# Patient Record
Sex: FEMALE | Race: WHITE | NOT HISPANIC OR LATINO | Employment: OTHER | URBAN - METROPOLITAN AREA
[De-identification: names, ages, dates, MRNs, and addresses within clinical notes are randomized per-mention and may not be internally consistent; named-entity substitution may affect disease eponyms.]

---

## 2017-07-22 ENCOUNTER — HOSPITAL ENCOUNTER (EMERGENCY)
Facility: CLINIC | Age: 32
Discharge: HOME | End: 2017-07-22
Attending: PHYSICIAN ASSISTANT | Admitting: PHYSICIAN ASSISTANT
Payer: MEDICARE

## 2017-07-22 VITALS
TEMPERATURE: 98.1 F | HEART RATE: 72 BPM | RESPIRATION RATE: 16 BRPM | OXYGEN SATURATION: 95 % | WEIGHT: 198 LBS | DIASTOLIC BLOOD PRESSURE: 108 MMHG | SYSTOLIC BLOOD PRESSURE: 142 MMHG

## 2017-07-22 DIAGNOSIS — K08.89 PAIN, DENTAL: Primary | ICD-10-CM

## 2017-07-22 PROCEDURE — 99213 OFFICE O/P EST LOW 20 MIN: CPT | Performed by: PHYSICIAN ASSISTANT

## 2017-07-22 PROCEDURE — 99212 OFFICE O/P EST SF 10 MIN: CPT

## 2017-07-22 RX ORDER — PENICILLIN V POTASSIUM 500 MG/1
500 TABLET, FILM COATED ORAL 4 TIMES DAILY
Qty: 40 TABLET | Refills: 0 | Status: SHIPPED | OUTPATIENT
Start: 2017-07-22 | End: 2017-08-01

## 2017-07-22 ASSESSMENT — ENCOUNTER SYMPTOMS
FACIAL SWELLING: 0
CARDIOVASCULAR NEGATIVE: 1
RESPIRATORY NEGATIVE: 1
MUSCULOSKELETAL NEGATIVE: 1
CONSTITUTIONAL NEGATIVE: 1

## 2017-07-22 NOTE — ED AVS SNAPSHOT
[facility] Emergency Department    [address] [address]    Phone:  [phone]    Fax:  [phone]                                       Haroon Cruz   MRN: [id number]    Department:  [facility] Emergency Department   Date of Visit:  7/22/2017           Patient Information     Date Of Birth          1985        Your diagnoses for this visit were:     Pain, dental        You were seen by Blanca Sanchez PA-C.      Follow-up Information     Schedule an appointment as soon as possible for a visit with Dentist.        Follow up with [facility] Emergency Department.    Specialty:  EMERGENCY MEDICINE    Why:  As needed, If symptoms worsen    Contact information:    [address] [address] [phone]    Additional information:    The medical center is located at   [address] ([address]   [facility] [address] in [place], [place] north   of [place]).        Discharge Instructions       Many of these clinics offer a sliding fee option for patients that qualify, and see patients on a walk-in or same day basis. Please call each clinic directly. As services, hours, fees and policies vary greatly.          [facility], [facility]  [phone]  Sees no insurance  [facility], [place]  [phone]  Preventive services only  [facility] (mult loc) [phone]  [facility]    ([facility]), [facility]  [phone]  [facility]       [phone]  Preventive services only  [facility]  [phone]  Accepts MA & sees no ins  Critical access hospital Dental Care,      Accepts MA & sees no ins   [place]   [phone]; [phone]  [facility]   Accepts MA & sees no ins       [phone]  [facility], [facility]  [phone]   Accepts MA emergencies  [facility] [phone]  [facility],     Accepts  [facility]   [phone]    [facility], [place] [phone]  Accepts MA & sees no ins   [facility]   Dental Clinic    [phone]  [facility], [facility]  [phone]   [facility] [phone]  [facility]  Preventive services only   [place]   [phone]  [facility] [phone]  [facility] [place] [facility], [place]  [phone]  Same day [facility] [phone]  Same day [facility],      Same day [facility]   [phone]    [facility], [facility] [phone]  Free clinic, walk-in only  [facility] (multiple locations) [phone]      [facility] , [facility] [phone]    [facility] [phone]  Free clinic, walk-in only  [facility]  [phone]  [facility] [phone] (adults)       [phone] (children)  [facility] [phone]    Also, referral service for low cost dental and healthcare: [phone]  And [phone]        Discharge References/Attachments     DENTAL PAIN (ENGLISH)      24 Hour Appointment Hotline       To make an appointment at any [facility], call [phone] ([phone]). If you don't have a family doctor or clinic, we will help you find one. [facility] are conveniently located to serve the needs of you and your family.             Review of your medicines      START taking        Dose / Directions Last dose taken    penicillin V potassium 500 MG tablet   Commonly known as:  VEETID   Dose:  500 mg   Quantity:  40 tablet        Take 1 tablet (500 mg) by mouth 4 times daily for 10 days   Refills:  0          Our records show that you are taking the medicines listed below. If these are incorrect, please call your family doctor or clinic.        Dose / Directions Last dose  taken    docusate sodium 100 MG capsule   Commonly known as:  COLACE   Dose:  100 mg   Quantity:  60 capsule        Take 1 capsule (100 mg) by mouth 2 times daily Continue until regular bowel movements resume   Refills:  0        fluticasone 50 MCG/ACT spray   Commonly known as:  FLONASE   Dose:  2 spray        Spray 2 sprays into both nostrils daily   Refills:  0        ibuprofen 400-800 mg tablet   Commonly known as:  ADVIL,MOTRIN   Dose:  800 mg   Quantity:  60 tablet        Take 2 tablets (800 mg) by mouth every 8 hours as needed for other (cramping)   Refills:  0        lanolin ointment   Quantity:  1 oz        Apply topically every hour as needed for dry skin (sore nipples)   Refills:  0        mometasone-formoterol 100-5 MCG/ACT oral inhaler   Commonly known as:  DULERA   Dose:  2 puff        Inhale 2 puffs into the lungs daily   Refills:  0        Prenatal Vitamins 28-0.8 MG Tabs   Dose:  1 tablet   Quantity:  90 tablet        Take 1 tablet by mouth daily   Refills:  11                Prescriptions were sent or printed at these locations (1 Prescription)                   [facility] [address] [place] [address] [address]   [address] [place] [address]    Telephone:  [phone]   Fax:  [phone]   Hours:                  E-Prescribed (1 of 1)         penicillin V potassium (VEETID) 500 MG tablet                Orders Needing Specimen Collection     None      Pending Results     No orders found from 7/20/2017 to 7/23/2017.            Pending Culture Results     No orders found from 7/20/2017 to 7/23/2017.            Pending Results Instructions     If you had any lab results that were not finalized at the time of your Discharge, you can call the ED Lab Result RN at [phone]. You will be contacted by this team for any positive Lab results or changes in treatment. The nurses are available 7 days a week from 10A to 6:30P.  You can leave a message 24 hours per day and they will return  "your call.        Test Results From Your Hospital Stay               Thank you for choosing [facility]       Thank you for choosing [facility] for your care. Our goal is always to provide you with excellent care. Hearing back from our patients is one way we can continue to improve our services. Please take a few minutes to complete the written survey that you may receive in the mail after you visit with us. Thank you!        [employer] Information     [employer] lets you send messages to your doctor, view your test results, renew your prescriptions, schedule appointments and more. To sign up, go to [email or web address] . Click on \"Log in\" on the left side of the screen, which will take you to the Welcome page. Then click on \"Sign up Now\" on the right side of the page.     You will be asked to enter the access code listed below, as well as some personal information. Please follow the directions to create your username and password.     Your access code is: [email or web address]  Expires: 10/20/2017  1:48 PM     Your access code will  in 90 days. If you need help or a new code, please call your [facility] clinic or [phone].        Care EveryWhere ID     This is your Care EveryWhere ID. This could be used by other organizations to access your [facility] medical records  [id number]        Equal Access to Services     NAYELI AGUILAR : Gisele carreno Sojhonny, waaxda luqadaha, qaybta kaalmada adeegyada, nava gonsalves. So [facility] [phone].    ATENCIÓN: Si habla español, tiene a hall disposición servicios gratuitos de asistencia lingüística. Llame al [phone].    We comply with applicable federal civil rights laws and [place] laws. We do not discriminate on the basis of race, color, national origin, age, disability sex, sexual orientation or gender identity.            After Visit Summary       This is your record. Keep this with you and show to your community pharmacist(s) and doctor(s) at " your next visit.

## 2017-07-22 NOTE — DISCHARGE INSTRUCTIONS
Many of these clinics offer a sliding fee option for patients that qualify, and see patients on a walk-in or same day basis. Please call each clinic directly. As services, hours, fees and policies vary greatly.          [facility], [facility]  [phone]  Sees no insurance  [facility], [place]  [phone]  Preventive services only  [facility] (mult loc) [phone]  [facility]    ([facility]), [facility] [place]  [phone]  [facility], [place]       [phone]  Preventive services only  [facility]  [phone]  Accepts MA & sees no ins  [facility],      Accepts MA & sees no ins   [place]   [phone]; [phone]  [facility], [facility]   Accepts MA & sees no ins       [phone]  [facility], [facility] [place]  [phone]   Accepts MA emergencies  [facility], [place] [phone]  [facility],     Accepts MA   [place]   [phone]    [facility] [phone]  Accepts MA & sees no ins   [facility]   Dental Clinic    [phone]  [facility], [facility] [place]  [phone]   [facility] [phone]  [facility]  Preventive services only   [place]   [phone]  [facility] [phone]  [facility], [place]  [phone]  Same day [facility] [phone]  Same day [facility],      Same day [facility]   [phone]    [facility], [facility] [place] [phone]  Free [facility], walk-in only  [facility] (multiple locations) [phone]      [facility] , [facility] [place] [phone]    [facility] [phone]  Free clinic, walk-in only  [facility]  [phone]  [facility] [phone] (adults)       [phone]  (children)  [place] [facility] [phone]    Also, referral service for low cost dental and healthcare: [phone]  And [phone]

## 2017-07-22 NOTE — ED AVS SNAPSHOT
[facility] Emergency Department    [address] [address]    Phone:  [phone]    Fax:  [phone]                                       Haroon Cruz   MRN: [id number]    Department:  [facility] Emergency Department   Date of Visit:  7/22/2017           After Visit Summary Signature Page     I have received my discharge instructions, and my questions have been answered. I have discussed any challenges I see with this plan with the nurse or doctor.    ..........................................................................................................................................  Patient/Patient Representative Signature      ..........................................................................................................................................  Patient Representative Print Name and Relationship to Patient    ..................................................               ................................................  Date                                            Time    ..........................................................................................................................................  Reviewed by Signature/Title    ...................................................              ..............................................  Date                                                            Time

## 2017-07-22 NOTE — ED PROVIDER NOTES
History     Chief Complaint   Patient presents with     Dental Pain     top right side dental pain, reoccured from 15 years ago.      HPI  Haroon Cruz is a 32 year old female who presents with complaints of left upper and lower dental pain for the past several days.  She states she has a hole in her left upper tooth that has never been evaluated.  Patient notes having a history of dental issues over the past 15 years but has not seen a dentist in quite some time.  Denies facial swelling, neck pain/stiffness, or difficulties handling secretions.  She denies gingival swelling.  Denies fevers, chills, sore throat, or cough.    I have reviewed the Medications, Allergies, Past Medical and Surgical History, and Social History in the Epic system.    Allergies:   Allergies   Allergen Reactions     Sumatriptan Nausea and Vomiting     Adhesive Tape Rash         No current facility-administered medications on file prior to encounter.   Current Outpatient Prescriptions on File Prior to Encounter:  ibuprofen (ADVIL,MOTRIN) 400-800 mg tablet Take 2 tablets (800 mg) by mouth every 8 hours as needed for other (cramping)   lanolin ointment Apply topically every hour as needed for dry skin (sore nipples)   Prenatal Vit-Fe Fumarate-FA (PRENATAL VITAMINS) 28-0.8 MG TABS Take 1 tablet by mouth daily   docusate sodium (COLACE) 100 MG capsule Take 1 capsule (100 mg) by mouth 2 times daily Continue until regular bowel movements resume   mometasone-formoterol (DULERA) 100-5 MCG/ACT oral inhaler Inhale 2 puffs into the lungs daily   fluticasone (FLONASE) 50 MCG/ACT nasal spray Spray 2 sprays into both nostrils daily       Patient Active Problem List   Diagnosis     Normal vaginal delivery     Lice infestation       History reviewed. No pertinent surgical history.    Social History   Substance Use Topics     Smoking status: Former Smoker     Smokeless tobacco: Current User      Comment: pt uses chewing tobacco and ecigarettes all  pregnancy     Alcohol use No         There is no immunization history on file for this patient.    BMI: There is no height or weight on file to calculate BMI.      Review of Systems   Constitutional: Negative.    HENT: Positive for dental problem. Negative for facial swelling.    Respiratory: Negative.    Cardiovascular: Negative.    Musculoskeletal: Negative.    Skin: Negative.    All other systems reviewed and are negative.      Physical Exam   BP: (!) 142/108  Pulse: 72  Temp: 98.1  F (36.7  C)  Resp: 16  Weight: 89.8 kg (198 lb)  SpO2: 95 %  Physical Exam   Constitutional: She appears well-developed. No distress.   HENT:   Head: Normocephalic and atraumatic.   Mouth/Throat: Uvula is midline, oropharynx is clear and moist and mucous membranes are normal. No trismus in the jaw. No dental abscesses or uvula swelling.   Teeth #14 and 18 are tender to percussion.  No evidence of dental abscess.  No facial swelling.     Eyes: Conjunctivae and EOM are normal. Pupils are equal, round, and reactive to light.   Neck: Normal range of motion. Neck supple.   Cardiovascular: Normal rate, regular rhythm and normal heart sounds.    Pulmonary/Chest: Effort normal and breath sounds normal.   Musculoskeletal: Normal range of motion.   Lymphadenopathy:     She has no cervical adenopathy.   Neurological: She is alert.   Skin: Skin is warm and dry.       ED Course     ED Course     Procedures      Assessments & Plan (with Medical Decision Making)     DDx: pulpitis, dental abscess, trauma, dry socket, gingivitis, Garett's Angina    Pt is a 32 year old female who presents with complaints of left upper and lower dental pain for the past several days.  She states she has a hole in her left upper tooth that has never been evaluated.  Patient notes having a history of dental issues over the past 15 years but has not seen a dentist in quite some time.  Pt is afebrile.  On exam, teeth #14 and 18 are tender to percussion.  No evidence of  dental abscess.  No facial swelling.  Hand-outs were provided.    Patient was sent with PCN and was instructed to follow-up with a dentist within the next 2 days for continued care and management (she was given a list of local dentists to follow-up with).  She is to return to the ED for persistent and/or worsening symptoms.  Patient expressed understanding of the diagnosis and plan and was discharged home.    I have reviewed the nursing notes.    I have reviewed the findings, diagnosis, plan and need for follow up with the patient.    Discharge Medication List as of 7/22/2017  1:48 PM      START taking these medications    Details   penicillin V potassium (VEETID) 500 MG tablet Take 1 tablet (500 mg) by mouth 4 times daily for 10 days, Disp-40 tablet, R-0, E-Prescribe             Final diagnoses:   Pain, dental       7/22/2017   [facility] EMERGENCY DEPARTMENT     Blanca Sanchez PA-C  07/22/17 175       Blanca Sanchez PA-C  07/22/17 1756

## 2017-12-24 ENCOUNTER — HEALTH MAINTENANCE LETTER (OUTPATIENT)
Age: 32
End: 2017-12-24

## 2019-08-07 ENCOUNTER — HOSPITAL ENCOUNTER (EMERGENCY)
Facility: CLINIC | Age: 34
Discharge: HOME | End: 2019-08-07
Attending: NURSE PRACTITIONER | Admitting: NURSE PRACTITIONER
Payer: MEDICARE

## 2019-08-07 VITALS
SYSTOLIC BLOOD PRESSURE: 136 MMHG | TEMPERATURE: 98.3 F | DIASTOLIC BLOOD PRESSURE: 90 MMHG | OXYGEN SATURATION: 96 % | WEIGHT: 200 LBS

## 2019-08-07 DIAGNOSIS — K04.7 DENTAL INFECTION: ICD-10-CM

## 2019-08-07 DIAGNOSIS — F32.A DEPRESSION: ICD-10-CM

## 2019-08-07 PROCEDURE — 99283 EMERGENCY DEPT VISIT LOW MDM: CPT | Performed by: NURSE PRACTITIONER

## 2019-08-07 PROCEDURE — 99284 EMERGENCY DEPT VISIT MOD MDM: CPT | Mod: Z6 | Performed by: NURSE PRACTITIONER

## 2019-08-07 RX ORDER — PENICILLIN V POTASSIUM 500 MG/1
500 TABLET, FILM COATED ORAL 4 TIMES DAILY
Qty: 40 TABLET | Refills: 0 | Status: SHIPPED | OUTPATIENT
Start: 2019-08-07 | End: 2019-08-17

## 2019-08-07 ASSESSMENT — ENCOUNTER SYMPTOMS
SORE THROAT: 0
FACIAL SWELLING: 0
VOMITING: 0
FATIGUE: 0
NEUROLOGICAL NEGATIVE: 1
FEVER: 0
CHILLS: 1
COUGH: 0
ABDOMINAL PAIN: 0

## 2019-08-07 NOTE — ED PROVIDER NOTES
History     Chief Complaint   Patient presents with     Dental Pain     started yesterday     Mental Health Problem     answered yes to suicude screening,      HPI  Haroon Cruz is a 34 year old female who presents to the emergency department for evaluation of dental pain.  Symptoms started over the last 2 to 3 days.  However this is been going on for the last year.  Patient has had intermittent lateral upper molar pain and states that she has been following with a dentist but they never do anything.  Patient has not seen the dentist in the last 2 weeks.  Reports feeling hot and cold.  Denies any objective fever.  Denies facial swelling.  Denies nausea or vomiting.    Additionally, patient answered yes to the suicide screen during triage.  After more discussion with patient she reports feeling frustrated with her relationship and with her mother.  She feels that she is always being yelled at and criticized.  Patient feels like her significant other does not spend enough time talking to her or listening to her.  Patient feels that she is being ignored.  Patient denies any suicidal plan.  Patient feels safe in her home.  Patient states she does have fleeting thoughts of suicide, but no specific plan and denies that she would ever act on her thoughts.  She would just like her boyfriend to pay more attention to her.  Patient is not interested in talking to the mental health  here today, but is open to counseling.    Allergies:  Allergies   Allergen Reactions     Sumatriptan Nausea and Vomiting     Adhesive Tape Rash       Problem List:    Patient Active Problem List    Diagnosis Date Noted     Normal vaginal delivery 02/01/2015     Priority: Medium     Lice infestation 02/01/2015     Priority: Medium        Past Medical History:    Past Medical History:   Diagnosis Date     Lice infestation 2/1/2015     Normal vaginal delivery 2/1/2015       Past Surgical History:    No past surgical history on  file.    Family History:    No family history on file.    Social History:  Marital Status:  Single [1]  Social History     Tobacco Use     Smoking status: Former Smoker     Smokeless tobacco: Current User     Tobacco comment: pt uses chewing tobacco and ecigarettes all pregnancy   Substance Use Topics     Alcohol use: No     Drug use: No        Medications:      penicillin V (VEETID) 500 MG tablet   docusate sodium (COLACE) 100 MG capsule   fluticasone (FLONASE) 50 MCG/ACT nasal spray   ibuprofen (ADVIL,MOTRIN) 400-800 mg tablet   lanolin ointment   mometasone-formoterol (DULERA) 100-5 MCG/ACT oral inhaler   Prenatal Vit-Fe Fumarate-FA (PRENATAL VITAMINS) 28-0.8 MG TABS         Review of Systems   Constitutional: Positive for chills. Negative for fatigue and fever.   HENT: Positive for dental problem and ear pain. Negative for congestion, facial swelling and sore throat.    Respiratory: Negative for cough.    Cardiovascular: Negative for chest pain.   Gastrointestinal: Negative for abdominal pain and vomiting.   Neurological: Negative.        Physical Exam   BP: (!) 136/90  Heart Rate: 85  Temp: 98.3  F (36.8  C)  Weight: 90.7 kg (200 lb)  SpO2: 96 %      Physical Exam  General: healthy, alert and mild distress  ENT: ENT exam normal, no neck nodes or sinus tenderness  Mouth: facial swelling is Absent   tenderness to touch at tooth: bilateral upper molars with surrounding gingival inflammation- worse on the upper right.   Teeth carious:yes and severe    Teeth broken: yes   Visible abscess: no         ED Course        Procedures               No results found for this or any previous visit (from the past 24 hour(s)).    Medications - No data to display    Assessments & Plan (with Medical Decision Making)   Dental infection:  --exam suspicious for dental infection, likely right upper molar given her exam. No obvious abscess needing I & D.  No fevers or other worrisome findings to suggest systemic infection.   ---given  Rx for penicillin and instructed to see dentist ASAP. Return for worsening.    Regarding her suicide screen positive on arrival-- patient appears to be more frustrated and depressed with her relationship with her boyfriend and increased stress with her mother. Patient denies feeling unsafe at home or suicidal plans.  Patient declines wanting to talk to a mental health  today but is open to a mental health referral and counseling regarding her depression. Instructed to return if feeling unsafe at home, suicidal or feeling worse in any way.    I have reviewed the nursing notes.    I have reviewed the findings, diagnosis, plan and need for follow up with the patient.         Medication List      Started    penicillin V 500 MG tablet  Commonly known as:  VEETID  500 mg, Oral, 4 TIMES DAILY            Final diagnoses:   Dental infection   Depression       8/7/2019   [facility] EMERGENCY DEPARTMENT     Brittaney Shi APRN CNP  08/07/19 1709

## 2019-08-07 NOTE — ED AVS SNAPSHOT
[facility] Emergency Department  [address] [address]  Phone:  [phone]  Fax:  [phone]                                    Haroon Cruz   MRN: [id number]    Department:  [facility] Emergency Department   Date of Visit:  8/7/2019           After Visit Summary Signature Page    I have received my discharge instructions, and my questions have been answered. I have discussed any challenges I see with this plan with the nurse or doctor.    ..........................................................................................................................................  Patient/Patient Representative Signature      ..........................................................................................................................................  Patient Representative Print Name and Relationship to Patient    ..................................................               ................................................  Date                                   Time    ..........................................................................................................................................  Reviewed by Signature/Title    ...................................................              ..............................................  Date                                               Time          22EPIC Rev 08/18

## 2019-08-07 NOTE — DISCHARGE INSTRUCTIONS
Dental infection:  Start penicillin 500 mg 4 times a day for 10 days.  Follow-up with dentist ASAP.  -return for fevers, facial swelling, vomiting, or getting worse.    Depression:  Mental health referral sent. They should contact  you to set up appt. You can also make appointment. [phone]  Return to the emergency department if you feel unsafe being at home, suicidal or worse in any way.

## 2019-08-07 NOTE — ED NOTES
Pt has painful left and rt upper molar-started 2 mos ago has been feeling unwell and hot/cold lately-states that she has been depressed because her mother and good friend have been down on her-has had suicidal thoughts but no attempt and no plan

## 2020-09-01 ENCOUNTER — HOSPITAL ENCOUNTER (EMERGENCY)
Facility: CLINIC | Age: 35
Discharge: HOME | End: 2020-09-02
Attending: EMERGENCY MEDICINE | Admitting: EMERGENCY MEDICINE
Payer: MEDICARE

## 2020-09-01 DIAGNOSIS — S89.91XA KNEE INJURY, RIGHT, INITIAL ENCOUNTER: ICD-10-CM

## 2020-09-01 PROCEDURE — 99283 EMERGENCY DEPT VISIT LOW MDM: CPT | Performed by: EMERGENCY MEDICINE

## 2020-09-01 PROCEDURE — 99284 EMERGENCY DEPT VISIT MOD MDM: CPT | Mod: Z6 | Performed by: EMERGENCY MEDICINE

## 2020-09-01 ASSESSMENT — MIFFLIN-ST. JEOR: SCORE: 1743.23

## 2020-09-01 NOTE — ED AVS SNAPSHOT
[facility] Emergency Department  [address] [address]  Phone:  [phone]  Fax:  [phone]                                    Haroon Cruz   MRN: [id number]    Department:  [facility] Emergency Department   Date of Visit:  9/1/2020           After Visit Summary Signature Page    I have received my discharge instructions, and my questions have been answered. I have discussed any challenges I see with this plan with the nurse or doctor.    ..........................................................................................................................................  Patient/Patient Representative Signature      ..........................................................................................................................................  Patient Representative Print Name and Relationship to Patient    ..................................................               ................................................  Date                                   Time    ..........................................................................................................................................  Reviewed by Signature/Title    ...................................................              ..............................................  Date                                               Time          22EPIC Rev 08/18

## 2020-09-02 ENCOUNTER — APPOINTMENT (OUTPATIENT)
Dept: GENERAL RADIOLOGY | Facility: CLINIC | Age: 35
End: 2020-09-02
Attending: EMERGENCY MEDICINE
Payer: MEDICARE

## 2020-09-02 VITALS
OXYGEN SATURATION: 99 % | WEIGHT: 232 LBS | TEMPERATURE: 97.8 F | DIASTOLIC BLOOD PRESSURE: 96 MMHG | SYSTOLIC BLOOD PRESSURE: 148 MMHG | HEIGHT: 65 IN | BODY MASS INDEX: 38.65 KG/M2 | HEART RATE: 75 BPM

## 2020-09-02 PROCEDURE — 94640 AIRWAY INHALATION TREATMENT: CPT | Mod: 76

## 2020-09-02 PROCEDURE — 25000125 ZZHC RX 250: Performed by: EMERGENCY MEDICINE

## 2020-09-02 PROCEDURE — 73560 X-RAY EXAM OF KNEE 1 OR 2: CPT | Mod: RT

## 2020-09-02 PROCEDURE — 40000275 ZZH STATISTIC RCP TIME EA 10 MIN

## 2020-09-02 PROCEDURE — 94640 AIRWAY INHALATION TREATMENT: CPT

## 2020-09-02 RX ORDER — ALBUTEROL SULFATE 0.83 MG/ML
2.5 SOLUTION RESPIRATORY (INHALATION) ONCE
Status: COMPLETED | OUTPATIENT
Start: 2020-09-02 | End: 2020-09-02

## 2020-09-02 RX ORDER — ACETAMINOPHEN 500 MG
1000 TABLET ORAL EVERY 8 HOURS PRN
Qty: 30 TABLET | Refills: 0 | COMMUNITY
Start: 2020-09-02 | End: 2020-09-07

## 2020-09-02 RX ORDER — IBUPROFEN 200 MG
400-800 TABLET ORAL EVERY 8 HOURS PRN
Qty: 30 TABLET | Refills: 0 | COMMUNITY
Start: 2020-09-02 | End: 2020-09-07

## 2020-09-02 RX ADMIN — ALBUTEROL SULFATE 2.5 MG: 2.5 SOLUTION RESPIRATORY (INHALATION) at 00:31

## 2020-09-02 ASSESSMENT — ENCOUNTER SYMPTOMS
FEVER: 0
CHEST TIGHTNESS: 0
CHILLS: 0
APPETITE CHANGE: 0
HEADACHES: 0
WOUND: 0
SHORTNESS OF BREATH: 0
DYSURIA: 0
COUGH: 0
ABDOMINAL PAIN: 0
FATIGUE: 0
LIGHT-HEADEDNESS: 0
BACK PAIN: 0
NECK PAIN: 0
NAUSEA: 0

## 2020-09-02 NOTE — ED PROVIDER NOTES
"  History     Chief Complaint   Patient presents with     Knee Pain     fell, \"knee keeps going out\"     HPI  Haroon Cruz is a 35 year old female with a history of obesity presenting for evaluation of an injury to her right knee.  Patient reports she was walking around some playground equipment when she slipped and her knee gave out causing her to fall to the ground.  She reports she felt her knee slip laterally.  She reports ongoing severe pain in the right lateral aspect of her knee.  Patient also reports that when she tries to bend or move it, she feels the knee slipping out of place.  Denies any numbness, tingling, or weakness in the lower leg.  Denies pain of the front of the knee or the medial side of the knee.  Denies previous injury to this knee.  Denies any pain in her hip, back, or upper extremities.  No head or neck pain.  Patient reports difficulty with ambulation due to severe pain and sensation of knee instability.    Allergies:  Allergies   Allergen Reactions     Sumatriptan Nausea and Vomiting     Adhesive Tape Rash       Problem List:    Patient Active Problem List    Diagnosis Date Noted     Normal vaginal delivery 02/01/2015     Priority: Medium     Lice infestation 02/01/2015     Priority: Medium        Past Medical History:    Past Medical History:   Diagnosis Date     Lice infestation 2/1/2015     Normal vaginal delivery 2/1/2015       Past Surgical History:    No past surgical history on file.    Family History:    No family history on file.    Social History:  Marital Status:  Single [1]  Social History     Tobacco Use     Smoking status: Former Smoker     Smokeless tobacco: Current User     Tobacco comment: pt uses chewing tobacco and ecigarettes all pregnancy   Substance Use Topics     Alcohol use: No     Drug use: No        Medications:    acetaminophen (TYLENOL) 500 MG tablet  ibuprofen (ADVIL/MOTRIN) 200 MG tablet  docusate sodium (COLACE) 100 MG capsule  fluticasone (FLONASE) 50 " "MCG/ACT nasal spray  ibuprofen (ADVIL,MOTRIN) 400-800 mg tablet  lanolin ointment  mometasone-formoterol (DULERA) 100-5 MCG/ACT oral inhaler  Prenatal Vit-Fe Fumarate-FA (PRENATAL VITAMINS) 28-0.8 MG TABS          Review of Systems   Constitutional: Negative for appetite change, chills, fatigue and fever.   HENT: Negative for congestion.    Respiratory: Negative for cough, chest tightness and shortness of breath.    Cardiovascular: Negative for chest pain.   Gastrointestinal: Negative for abdominal pain and nausea.   Genitourinary: Negative for decreased urine volume, dysuria and vaginal bleeding.   Musculoskeletal: Negative for back pain and neck pain.        Right knee pain and swelling   Skin: Negative for wound.   Neurological: Negative for light-headedness and headaches.   All other systems reviewed and are negative.      Physical Exam   BP: (!) 164/100  Pulse: 85  Temp: 97.8  F (36.6  C)  Height: 165.1 cm (5' 5\")  Weight: 105.2 kg (232 lb)  SpO2: 100 %      Physical Exam  Vitals signs and nursing note reviewed.   Constitutional:       Appearance: She is obese.   HENT:      Head: Normocephalic and atraumatic.      Nose: Nose normal.      Mouth/Throat:      Mouth: Mucous membranes are moist.   Eyes:      Conjunctiva/sclera: Conjunctivae normal.   Cardiovascular:      Rate and Rhythm: Normal rate.      Pulses: Normal pulses.   Pulmonary:      Effort: Pulmonary effort is normal.   Musculoskeletal:      Right knee: She exhibits decreased range of motion, swelling (Anterior lateral knee) and LCL laxity (Suspect some mild lateral laxity present, exam somewhat limited due to pain of acute injury). She exhibits no ecchymosis, no deformity, no laceration, no erythema and normal patellar mobility. Tenderness found. Lateral joint line and LCL tenderness noted. No medial joint line and no patellar tendon tenderness noted.   Skin:     General: Skin is warm and dry.      Capillary Refill: Capillary refill takes less than 2 " seconds.   Neurological:      Mental Status: She is oriented to person, place, and time.   Psychiatric:         Mood and Affect: Mood normal.         ED Course        Procedures                   Results for orders placed or performed during the hospital encounter of 09/01/20 (from the past 24 hour(s))   XR Knee Right 1/2 Views    Narrative    EXAM: XR KNEE RT 1 /2 VW  LOCATION: [facility]  DATE/TIME: 9/2/2020 12:22 AM    INDICATION: Injury with fall and lateral right knee pain.  COMPARISON: None.      Impression    IMPRESSION: Normal joint spaces and alignment. No fracture or joint effusion.       Medications   albuterol (PROVENTIL) neb solution 2.5 mg (2.5 mg Nebulization Given 9/2/20 0031)       Assessments & Plan (with Medical Decision Making)  35-year-old female with obesity presenting for evaluation of a fall and right knee injury.  Patient felt her knee give out laterally and feels instability with movement ever since.  On exam she has some mild lateral swelling and notable tenderness over the lateral joint line.  No medial pain or tenderness.  X-ray negative for fracture or large effusion.  Differentials include mild knee sprain versus more severe lateral collateral ligament or other major ligamentous disruption.  No distal neurologic deficit.  Placed patient in a knee immobilizer and given crutches with a plan to follow-up with orthopedics in 1 to 2 weeks for repeat exam for further assessment of possible underlying severe ligamentous injury.     I have reviewed the nursing notes.    I have reviewed the findings, diagnosis, plan and need for follow up with the patient.       New Prescriptions    ACETAMINOPHEN (TYLENOL) 500 MG TABLET    Take 2 tablets (1,000 mg) by mouth every 8 hours as needed for fever or pain    IBUPROFEN (ADVIL/MOTRIN) 200 MG TABLET    Take 2-4 tablets (400-800 mg) by mouth every 8 hours as needed for mild pain       Final diagnoses:   Knee injury, right, initial encounter  - possible ligament injury       9/1/2020   [facility] EMERGENCY DEPARTMENT     Miller, Mitchel Douglas MD  09/02/20 0054

## 2020-10-08 ENCOUNTER — OFFICE VISIT (OUTPATIENT)
Dept: ORTHOPEDICS | Facility: CLINIC | Age: 35
End: 2020-10-08
Payer: MEDICARE

## 2020-10-08 VITALS
HEIGHT: 65 IN | BODY MASS INDEX: 38.65 KG/M2 | SYSTOLIC BLOOD PRESSURE: 149 MMHG | DIASTOLIC BLOOD PRESSURE: 106 MMHG | WEIGHT: 232 LBS

## 2020-10-08 DIAGNOSIS — M23.8X1 ACL LAXITY, RIGHT: ICD-10-CM

## 2020-10-08 DIAGNOSIS — S89.91XD INJURY OF RIGHT KNEE, SUBSEQUENT ENCOUNTER: ICD-10-CM

## 2020-10-08 DIAGNOSIS — M25.561 ACUTE PAIN OF RIGHT KNEE: Primary | ICD-10-CM

## 2020-10-08 PROCEDURE — 99204 OFFICE O/P NEW MOD 45 MIN: CPT | Performed by: FAMILY MEDICINE

## 2020-10-08 ASSESSMENT — MIFFLIN-ST. JEOR: SCORE: 1743.23

## 2020-10-08 NOTE — LETTER
"    10/8/2020         RE: Haroon Cruz  [address]  [place] [place] [facility] [address]        Dear Colleague,    Thank you for referring your patient, Haroon Cruz, to the [facility] [place]. Please see a copy of my visit note below.    Haroon Cruz  :  1985  DOS: 10/8/2020  MRN: [id number]    Sports Medicine Clinic Visit    PCP: Aicha Hernandez    Haroon Cruz is a 35 year old female who is seen as an ER referral presenting with acute right knee pain.    Injury: Slipped walking on playground equipment, landing with right knee bent underneath self ~ 6 weeks ago (20).  Pain located over right deep anterior lateral knee, nonradiating.  Additional Features:  Positive: swelling, grinding and weakness.  Symptoms are better with Rest and hinged brace.  Symptoms are worse with: bending knee, going from sit to stand, walking.  Other evaluation and/or treatments so far consists of: Ice, Ibuprofen, Rest and ER visit, knee immobilizer, hinged brace.  Recent imaging completed: X-rays completed 20.  Prior History of related problems: none    Social History: unemployed    Review of Systems  Musculoskeletal: as above  Remainder of review of systems is negative including constitutional, CV, pulmonary, GI, Skin and Neurologic except as noted in HPI or medical history.    Past Medical History:   Diagnosis Date     Lice infestation 2015     Normal vaginal delivery 2015     No past surgical history on file.  No family history on file.    Objective  BP (!) 149/106   Ht 1.651 m (5' 5\")   Wt 105.2 kg (232 lb)   BMI 38.61 kg/m        General: healthy, alert and in no distress, obese      HEENT: no scleral icterus or conjunctival erythema     Skin: no suspicious lesions or rash. No jaundice.     CV: regular rhythm by palpation, 2+ distal pulses, no pedal edema      Resp: normal respiratory effort without conversational dyspnea     Psych: normal mood and affect      Gait: " antalgic, appropriate coordination and balance     Neuro: normal light touch sensory exam of the extremities. Motor strength as noted below     Right Knee exam    ROM:        Full active and passive ROM with flexion and extension    Inspection:       no visible ecchymosis       no visible edema or effusion    Skin:       no visible deformities       well perfused       capillary refill brisk    Patellar Motion:        Normal patellar tracking noted through range of motion       Lateral tilt noted in patella       Crepitus noted in the patellofemoral joint    Tender:        Anterolateral knee joint    Non Tender:         remainder of knee area    Special Tests:        positive (+) Kathryn       positive (+) Lachman       positive (+) anterior drawer       neg (-) posterior drawer       neg (-) varus at 0 deg and 30 deg       neg (-) valgus at 0 deg and 30 deg       no pain with forced extension    Evaluation of ipsilateral kinetic chain       normal strength with hip extension and abduction       Decreased right quad tone      Radiology  Results for orders placed or performed during the hospital encounter of 09/01/20   XR Knee Right 1/2 Views    Narrative    EXAM: XR KNEE RT 1 /2 VW  LOCATION: [facility]  DATE/TIME: 9/2/2020 12:22 AM    INDICATION: Injury with fall and lateral right knee pain.  COMPARISON: None.      Impression    IMPRESSION: Normal joint spaces and alignment. No fracture or joint effusion.       Assessment:  1. Acute pain of right knee    2. ACL laxity, right    3. Injury of right knee, subsequent encounter        Plan:  Discussed the assessment with the patient.  Follow up: will contact with MRI results  Persistent instability sx, concern on exam for subacute ACL tear  Continue hinged brace   Start PT to work on ROM and reconditioning, safe HEP  Consider surgical referral if ACL tear confirmed, although she seems more interested in conservative approach when discussing that option  today  XR images independently visualized and reviewed with patient today in clinic  Home handouts provided and supportive care reviewed  All questions were answered today  Contact us with additional questions or concerns  Signs and sx of concern reviewed      Moshe Jade DO, LOIS  Primary Care Sports Medicine  [facility]               Disclaimer: This note consists of symbols derived from keyboarding, dictation and/or voice recognition software. As a result, there may be errors in the script that have gone undetected. Please consider this when interpreting information found in this chart.      Again, thank you for allowing me to participate in the care of your patient.        Sincerely,        Moshe Jade DO

## 2020-10-08 NOTE — PROGRESS NOTES
"Haroon Cruz  :  1985  DOS: 10/8/2020  MRN: [id number]    Sports Medicine Clinic Visit    PCP: Aicha Hernandez    Haroon Cruz is a 35 year old female who is seen as an ER referral presenting with acute right knee pain.    Injury: Slipped walking on playground equipment, landing with right knee bent underneath self ~ 6 weeks ago (20).  Pain located over right deep anterior lateral knee, nonradiating.  Additional Features:  Positive: swelling, grinding and weakness.  Symptoms are better with Rest and hinged brace.  Symptoms are worse with: bending knee, going from sit to stand, walking.  Other evaluation and/or treatments so far consists of: Ice, Ibuprofen, Rest and ER visit, knee immobilizer, hinged brace.  Recent imaging completed: X-rays completed 20.  Prior History of related problems: none    Social History: unemployed    Review of Systems  Musculoskeletal: as above  Remainder of review of systems is negative including constitutional, CV, pulmonary, GI, Skin and Neurologic except as noted in HPI or medical history.    Past Medical History:   Diagnosis Date     Lice infestation 2015     Normal vaginal delivery 2015     No past surgical history on file.  No family history on file.    Objective  BP (!) 149/106   Ht 1.651 m (5' 5\")   Wt 105.2 kg (232 lb)   BMI 38.61 kg/m        General: healthy, alert and in no distress, obese      HEENT: no scleral icterus or conjunctival erythema     Skin: no suspicious lesions or rash. No jaundice.     CV: regular rhythm by palpation, 2+ distal pulses, no pedal edema      Resp: normal respiratory effort without conversational dyspnea     Psych: normal mood and affect      Gait: antalgic, appropriate coordination and balance     Neuro: normal light touch sensory exam of the extremities. Motor strength as noted below     Right Knee exam    ROM:        Full active and passive ROM with flexion and extension    Inspection:       no visible " ecchymosis       no visible edema or effusion    Skin:       no visible deformities       well perfused       capillary refill brisk    Patellar Motion:        Normal patellar tracking noted through range of motion       Lateral tilt noted in patella       Crepitus noted in the patellofemoral joint    Tender:        Anterolateral knee joint    Non Tender:         remainder of knee area    Special Tests:        positive (+) Kathryn       positive (+) Lachman       positive (+) anterior drawer       neg (-) posterior drawer       neg (-) varus at 0 deg and 30 deg       neg (-) valgus at 0 deg and 30 deg       no pain with forced extension    Evaluation of ipsilateral kinetic chain       normal strength with hip extension and abduction       Decreased right quad tone      Radiology  Results for orders placed or performed during the hospital encounter of 09/01/20   XR Knee Right 1/2 Views    Narrative    EXAM: XR KNEE RT 1 /2 VW  LOCATION: [facility]  DATE/TIME: 9/2/2020 12:22 AM    INDICATION: Injury with fall and lateral right knee pain.  COMPARISON: None.      Impression    IMPRESSION: Normal joint spaces and alignment. No fracture or joint effusion.       Assessment:  1. Acute pain of right knee    2. ACL laxity, right    3. Injury of right knee, subsequent encounter        Plan:  Discussed the assessment with the patient.  Follow up: will contact with MRI results  Persistent instability sx, concern on exam for subacute ACL tear  Continue hinged brace   Start PT to work on ROM and reconditioning, safe HEP  Consider surgical referral if ACL tear confirmed, although she seems more interested in conservative approach when discussing that option today  XR images independently visualized and reviewed with patient today in clinic  Home handouts provided and supportive care reviewed  All questions were answered today  Contact us with additional questions or concerns  Signs and sx of concern  reviewed      Moshe Jade DO, CAQ  Primary Care Sports Medicine  [facility]               Disclaimer: This note consists of symbols derived from keyboarding, dictation and/or voice recognition software. As a result, there may be errors in the script that have gone undetected. Please consider this when interpreting information found in this chart.

## 2020-10-20 ENCOUNTER — HOSPITAL ENCOUNTER (OUTPATIENT)
Dept: MRI IMAGING | Facility: CLINIC | Age: 35
Discharge: HOME | End: 2020-10-20
Attending: FAMILY MEDICINE | Admitting: FAMILY MEDICINE
Payer: MEDICARE

## 2020-10-20 DIAGNOSIS — M25.561 ACUTE PAIN OF RIGHT KNEE: ICD-10-CM

## 2020-10-20 DIAGNOSIS — M23.8X1 ACL LAXITY, RIGHT: ICD-10-CM

## 2020-10-20 DIAGNOSIS — S89.91XD INJURY OF RIGHT KNEE, SUBSEQUENT ENCOUNTER: ICD-10-CM

## 2020-10-20 LAB — RADIOLOGIST FLAGS: NORMAL

## 2020-10-20 PROCEDURE — 73721 MRI JNT OF LWR EXTRE W/O DYE: CPT | Mod: 26 | Performed by: RADIOLOGY

## 2020-10-20 PROCEDURE — 73721 MRI JNT OF LWR EXTRE W/O DYE: CPT | Mod: RT

## 2020-10-21 ENCOUNTER — TELEPHONE (OUTPATIENT)
Dept: ORTHOPEDICS | Facility: CLINIC | Age: 35
End: 2020-10-21

## 2020-10-21 NOTE — TELEPHONE ENCOUNTER
"Please contact Mallissa with MRI results.    - Confirmed ACL tear, which we were suspicious of  - bone edema or \"bruising\" from the injury, likely both from the fall and from the tear, this should improve with time  - some significant tearing of the MCL, on the inside of the knee.  This usually does NOT require surgery, but can be discussed with surgeon as well, usually does well with time, bracing, PT  - soft tissue edema in the posteromedial knee, likely a capsular injury, should also improve with time      I would recommend a visit with ortho surgery to discuss, especially the ACL tear.  As we dicussed at the visit, ACL tears do not HAVE to have surgery, but given her age, and she is active, this is worth a discussion over the +/- of surgery in the shorter and longer term.  There will be no \"rush\" to have surgery, if she decides to pursue.    Happy to discuss further with her as needed.      Thanks!    Moshe Jade DO, CAQ  Primary Care Sports Medicine  [facility]       "

## 2020-10-21 NOTE — TELEPHONE ENCOUNTER
Spoke to patient & spouse discussed results and recommendation for consultation with Ortho Surgeon.  Patient is scheduled for physical therapy tomorrow 10/22/20.  She is reluctant to have surgical consult at this time and they would like to follow up in clinic with Dr Jade to review in person.  This is reasonable, patient will follow up on 10/29/20 as currently scheduled.    May call back if desiring surgical consult.    Anthony Mccray ATC

## 2020-10-22 ENCOUNTER — HOSPITAL ENCOUNTER (OUTPATIENT)
Dept: PHYSICAL THERAPY | Facility: CLINIC | Age: 35
Setting detail: THERAPIES SERIES
End: 2020-10-22
Attending: FAMILY MEDICINE
Payer: MEDICARE

## 2020-10-22 DIAGNOSIS — M23.8X1 ACL LAXITY, RIGHT: ICD-10-CM

## 2020-10-22 DIAGNOSIS — S89.91XD INJURY OF RIGHT KNEE, SUBSEQUENT ENCOUNTER: ICD-10-CM

## 2020-10-22 DIAGNOSIS — M25.561 ACUTE PAIN OF RIGHT KNEE: ICD-10-CM

## 2020-10-22 PROCEDURE — 97110 THERAPEUTIC EXERCISES: CPT | Mod: GP | Performed by: PHYSICAL THERAPIST

## 2020-10-22 PROCEDURE — 97161 PT EVAL LOW COMPLEX 20 MIN: CPT | Mod: GP | Performed by: PHYSICAL THERAPIST

## 2020-10-22 NOTE — PROGRESS NOTES
10/22/20 1300   General Information   Type of Visit Initial OP Ortho PT Evaluation   Start of Care Date 10/22/20   Referring Physician Moshe Jade   Patient/Family Goals Statement not have surgery.    Orders Evaluate and Treat   Date of Order 10/08/20   Certification Required? Yes   Medical Diagnosis acute pain of right knee   Surgical/Medical history reviewed Yes   Precautions/Limitations no known precautions/limitations   General Information Comments PMH: asthma   Body Part(s)   Body Part(s) Knee   Presentation and Etiology   Pertinent history of current problem (include personal factors and/or comorbidities that impact the POC) Haroon Cruz is a 35 year old female with a history of obesity presenting for evaluation of an injury to her right knee.  Patient reports she was walking around some playground equipment when she slipped and her knee gave out causing her to fall to the ground.  She reports she felt her knee slip laterally.   She did have an MRI of the knee and they found a ACL tear and MCL parital tear. Knee pain hasn't been really bad, only sharp pain if she twists the knee. The knee has wanted to buckle twice. Has been without crutches for about 3 weeks.    Impairments A. Pain;M. Locking or catching   Functional Limitations perform required work activities;perform activities of daily living;perform desired leisure / sports activities   Symptom Location right knee   How/Where did it occur With a fall   Onset date of current episode/exacerbation 09/01/20   Chronicity New   Pain rating (0-10 point scale) Best (/10);Worst (/10)   Best (/10) 0   Worst (/10) 5-6/10   Pain quality A. Sharp   Frequency of pain/symptoms C. With activity   Pain/symptoms exacerbated by G. Certain positions   Pain/symptoms eased by A. Sitting;C. Rest;H. Cold   Progression of symptoms since onset: Improved   Current / Previous Interventions   Diagnostic Tests: MRI   MRI Results Results   MRI results confirmed ACL tear- see chart  for more details   Current Level of Function   Patient role/employment history C. Homemaker   [facility]   Fall Risk Screen   Fall screen completed by PT   Have you fallen 2 or more times in the past year? No   Have you fallen and had an injury in the past year? Yes   Is patient a fall risk? Yes   Abuse Screen (yes response referral indicated)   Feels Unsafe at Home or Work/School no   Feels Threatened by Someone no   Does Anyone Try to Keep You From Having Contact with Others or Doing Things Outside Your Home? no   Physical Signs of Abuse Present no   Functional Scales   Functional Scales Other   Other Scales  LEFS   Knee Objective Findings   Side (if bilateral, select both right and left) Right   Observation wearing two braces   Integumentary  37.5 cm circumference around patella    Posture thoracic kyphosis, decreased lumbar lordosis    Gait/Locomotion knee hyperextension during aden phase of gait, braces present- non antalgic gait but decreasd gait speed,   without knee brace very hesitant to put any weight onto the right LE    Knee ROM Comment WNL   Lachmans Test + on R    Anterior Drawer Test + on R    Posterior Drawer Test - R    Varus Stress Test negative R    Valgus Stress Test - for pain, + for mild laxity on R    Kathryn's Test not tested   Knee Special Test Comments Thessaleys test + for pain    Palpation tender to palpation over right MCL   Accessory Motion/Joint Mobility AP glide normal, PA glide hypermobile on right tibiofemoral joint    Right Knee Extension AROM -3   Right Knee Flexion AROM 140   Right Knee Flexion Strength right: 4+/5, left 5/5    Right Knee Extension Strength right: 4/5, left 5/5    Right Hip Abduction Strength right 4+/5    Right Quad Set Strength good quaity contraction, can complete 10 SLR with no ext lag   Right Gastrocnemius Flexibility WnL   Right Hamstring Flexibility WNL: 90/90: knee to 0    Planned Therapy Interventions   Planned Therapy  Interventions balance training;bed mobility training;gait training;joint mobilization;manual therapy;neuromuscular re-education;ROM;strengthening;stretching;transfer training   Planned Therapy Interventions Comment [id number]   Planned Modality Interventions   Planned Modality Interventions Cryotherapy;Electrical stimulation   Clinical Impression   Criteria for Skilled Therapeutic Interventions Met yes, treatment indicated   PT Diagnosis decreased right LE strength following ACL tear   Influenced by the following impairments weakness, joint laxity, swelling   Functional limitations due to impairments twisting, stairs, walking, daily chores   Clinical Presentation Stable/Uncomplicated   Clinical Presentation Rationale clinical decision making and chart review   Clinical Decision Making (Complexity) Low complexity   Therapy Frequency 1 time/week   Predicted Duration of Therapy Intervention (days/wks) 12 weeks   Risk & Benefits of therapy have been explained Yes   Patient, Family & other staff in agreement with plan of care Yes   Clinical Impression Comments Patient is a 35 year old female who presents to physical therapy following a fall that resulted in a ACL tear and MCL partial tear of the right knee. Patient trying to avoid surgery and wants to see if PT can help return her to baseline. Presents today with right joint laxity and right muscle weakness. PT prognosis is good with skilled PT intervention due to patient already having good ROM and low pain levels.    Education Assessment   Preferred Learning Style Listening;Demonstration;Pictures/video   Barriers to Learning Reading   ORTHO GOALS   PT Ortho Eval Goals 1;2;3;4   Ortho Goal 1   Goal Identifier 1   Goal Description Patient will be able to take 3 steps without knee brace on and feel confident knee won't buckle in order to transfer short distances.    Target Date 11/19/20   Ortho Goal 2   Goal Identifier 2   Goal Description Patient will be able to complete  30 SLR in a row without fatigue in order to improve quad control for walking.    Target Date 11/19/20   Ortho Goal 3   Goal Identifier 3   Goal Description Patient will be able to ascend/descend  steps with 1 railing with reciprocal gait pattern without any feelings of knee buckling.    Target Date 01/14/21   Ortho Goal 4   Goal Identifier 4   Goal Description Patient will report no buckling of the right knee during daily chores and activites.    Target Date 01/14/21   Total Evaluation Time   PT Eval, Low Complexity Minutes (05076) 24   Therapy Certification   Certification date from 10/22/20   Certification date to 01/14/21   Medical Diagnosis acute pain of right knee       Bindu Reynaga  PT, DPT       10/22/2020   [facility]  [address] [place] [address]   [address]  [place] [address]  [email or web address]  Voicemail: [phone]

## 2020-10-27 NOTE — PROGRESS NOTES
"Haroon Cruz  :  1985  DOS: 10/29/2020  MRN: [id number]    Sports Medicine Clinic Visit    PCP: Aicha Hernandez    Haroon Cruz is a 35 year old female who is seen as an ER referral presenting with acute right knee pain.    Injury: Slipped walking on playground equipment, landing with right knee bent underneath self ~ 6 weeks ago (20).  Pain located over right deep anterior lateral knee, nonradiating.  Additional Features:  Positive: swelling, grinding and weakness.  Symptoms are better with Rest and hinged brace.  Symptoms are worse with: bending knee, going from sit to stand, walking.  Other evaluation and/or treatments so far consists of: Ice, Ibuprofen, Rest and ER visit, knee immobilizer, hinged brace.  Recent imaging completed: X-rays completed 20.  Prior History of related problems: none    Social History: unemployed    Interim History - 2020  Since last visit on 10/8/2020 patient has pain in posterior knee and shooting pains up medial side of knee.  Brace has been helpful.  Throbs with the cold.  No interim injury.         Review of Systems  Musculoskeletal: as above  Remainder of review of systems is negative including constitutional, CV, pulmonary, GI, Skin and Neurologic except as noted in HPI or medical history.    Past Medical History:   Diagnosis Date     Lice infestation 2015     Normal vaginal delivery 2015     No past surgical history on file.  No family history on file.    Objective  Resp 12   Ht 1.651 m (5' 5\")   Wt 105.2 kg (232 lb)   BMI 38.61 kg/m        General: healthy, alert and in no distress, obese      HEENT: no scleral icterus or conjunctival erythema     Skin: no suspicious lesions or rash. No jaundice.     CV: regular rhythm by palpation, 2+ distal pulses, no pedal edema      Resp: normal respiratory effort without conversational dyspnea     Psych: normal mood and affect      Gait: antalgic, appropriate coordination and balance "     Neuro: normal light touch sensory exam of the extremities. Motor strength as noted below     Right Knee exam    ROM:        Full active and passive ROM with flexion and extension    Inspection:       no visible ecchymosis       no visible edema or effusion    Skin:       no visible deformities       well perfused       capillary refill brisk    Patellar Motion:        Normal patellar tracking noted through range of motion       Lateral tilt noted in patella       Crepitus noted in the patellofemoral joint    Tender:        Anterolateral knee joint    Non Tender:         remainder of knee area    Special Tests:        positive (+) Kathryn       positive (+) Lachman       positive (+) anterior drawer       neg (-) posterior drawer       neg (-) varus at 0 deg and 30 deg       painful valgus at 30 deg       no pain with forced extension    Evaluation of ipsilateral kinetic chain       normal strength with hip extension and abduction       Decreased right quad tone      Radiology  Results for orders placed or performed during the hospital encounter of 09/01/20   XR Knee Right 1/2 Views    Narrative    EXAM: XR KNEE RT 1 /2 VW  LOCATION: [facility]  DATE/TIME: 9/2/2020 12:22 AM    INDICATION: Injury with fall and lateral right knee pain.  COMPARISON: None.      Impression    IMPRESSION: Normal joint spaces and alignment. No fracture or joint effusion.     Results for orders placed or performed during the hospital encounter of 10/20/20   MR Knee Right w/o Contrast     Value    Radiologist flags Full-thickness tear of the midsubstance fibers of    Narrative    EXAMINATION: MRI of the right knee without contrast    DATE:  10/20/2020    HISTORY: Knee instability    TECHNIQUE: Multiplanar, multisequence MR imaging of the right knee was  obtained using standard sequences in 3 orthogonal planes without the  use of intravenous or intra-articular gadolinium contrast.    Comparison:  Comparison radiographs dated  9/2/2020 were reviewed,  which demonstrated no acute bone abnormality.    FINDINGS:    In the medial compartment, there is soft tissue edema at the junction  of the posterior horn and the posterior capsule, concerning for a  meniscocapsular injury.. There is no high-grade or full-thickness  cartilage loss or subchondral edema.    In the lateral compartment, there is horizontal signal throughout the  anterior horn, body, and posterior horn of the lateral meniscus with  the vertical peripheral tear involving the posterior horn, sagittal  series 6 image 8. There is no high-grade or full-thickness cartilage  loss or subchondral edema.    In the patellofemoral compartment, there is no high-grade or  full-thickness cartilage loss or subchondral edema.    The posterior cruciate ligament is intact.    Extensive bone marrow edema like signal intensity visualized  throughout the knee, including along the posterior aspect of the  medial and lateral tibial plateau, with bone marrow edema along the  medial and lateral femoral condyle. There is full-thickness tear  involving the mid substance fibers of the anterior cruciate ligament  with slight laxity of the more distal ACL fibers. Anterior translation  of the tibia with respect to the femur.    Increased signal around the tibial collateral ligament with high-grade  to full-thickness tear of the superficial fibers of the mid tibial  collateral ligament. There is marked edema in the region of the  meniscal femoral ligament.    The anterior iliotibial band, fibular collateral ligament, biceps  femoris tendon, and popliteus tendons are intact..     There is a small joint effusion. No popliteal cyst. No joint bodies.    The extensor mechanism is intact and normal in appearance.       Impression    IMPRESSION:  1. Small right knee joint effusion.  2. Extensive bone marrow edema about the right knee including the  posterior aspect of the medial and lateral tibial plateau as well  as  along the lateral and medial femoral condyle and throughout the  proximal tibia. There is associated full-thickness tear involving the  mid substance fibers of the anterior cruciate ligament with anterior  translation of the tibia with respect to the femur.  3. High-grade to full-thickness tear involving the superficial fibers  of the right knee tibial collateral ligament with tearing of the deep  meniscofemoral component.  4. Peripheral vertical tear involving the posterior horn of the right  knee lateral meniscus as above.  5. Soft tissue edema at the junction of the posterior horn of the  right knee medial meniscus with the capsular structures, possibly  representing a meniscocapsular injury/ramp lesion.  6. The lateral supporting structures are intact.  7. No high-grade cartilage loss.     [Consider Follow Up: Full-thickness tear of the midsubstance fibers of  the right knee anterior cruciate ligament with additional findings as  above.]    This report will be copied to the [facility] to ensure a  provider acknowledges the finding.     BRIANNA MOSQUEDA MD       Assessment:  1. ACL laxity, right    2. Injury of right knee, subsequent encounter    3. Acute pain of right knee    4. Rupture of anterior cruciate ligament of right knee, subsequent encounter    5. Tear of MCL (medial collateral ligament) of knee, left, subsequent encounter        Plan:  Discussed the assessment with the patient.  Follow up: prn  Detailed review of MRI results today, including ACL tear, MCL injury, meniscocapsular injury  Persistent instability sx, likely related to ACL tear  Continue hinged brace, new brace provided today as her brace does not fit well  Again recommended to start PT to work on ROM and reconditioning, safe HEP  Offered surgical referral if ACL tear again today, I think it would be good for her to have a consultation to review options, she declined again for now  She continues to be more interested in  conservative approach, and we discussed the relative short and long term risks, and I again emphasized the need to work with PT  XR and MR images independently visualized and reviewed with patient today in clinic  Home handouts provided and supportive care reviewed  All questions were answered today  Contact us with additional questions or concerns  Signs and sx of concern reviewed      Moshe Jade DO, LOIS  Primary Care Sports Medicine  [facility]       Time spent in one-on-one evaluation and discussion with patient regarding nature of problem, course, prior treatments, and therapeutic options, at least 50% of which was spent in counseling and coordination of care: 29 minutes        Disclaimer: This note consists of symbols derived from keyboarding, dictation and/or voice recognition software. As a result, there may be errors in the script that have gone undetected. Please consider this when interpreting information found in this chart.

## 2020-10-29 ENCOUNTER — OFFICE VISIT (OUTPATIENT)
Dept: ORTHOPEDICS | Facility: CLINIC | Age: 35
End: 2020-10-29
Payer: MEDICARE

## 2020-10-29 VITALS — HEIGHT: 65 IN | BODY MASS INDEX: 38.65 KG/M2 | RESPIRATION RATE: 12 BRPM | WEIGHT: 232 LBS

## 2020-10-29 DIAGNOSIS — S83.511D RUPTURE OF ANTERIOR CRUCIATE LIGAMENT OF RIGHT KNEE, SUBSEQUENT ENCOUNTER: ICD-10-CM

## 2020-10-29 DIAGNOSIS — M23.8X1 ACL LAXITY, RIGHT: Primary | ICD-10-CM

## 2020-10-29 DIAGNOSIS — M25.561 ACUTE PAIN OF RIGHT KNEE: ICD-10-CM

## 2020-10-29 DIAGNOSIS — S83.412D TEAR OF MCL (MEDIAL COLLATERAL LIGAMENT) OF KNEE, LEFT, SUBSEQUENT ENCOUNTER: ICD-10-CM

## 2020-10-29 DIAGNOSIS — S89.91XD INJURY OF RIGHT KNEE, SUBSEQUENT ENCOUNTER: ICD-10-CM

## 2020-10-29 PROCEDURE — 99214 OFFICE O/P EST MOD 30 MIN: CPT | Performed by: FAMILY MEDICINE

## 2020-10-29 ASSESSMENT — MIFFLIN-ST. JEOR: SCORE: 1743.23

## 2020-10-29 NOTE — LETTER
"    10/29/2020         RE: Haroon Cruz  [address]  [facility] [address]        Dear Colleague,    Thank you for referring your patient, Haroon Cruz, to the [facility] [place]. Please see a copy of my visit note below.    Haroon Cruz  :  1985  DOS: 10/29/2020  MRN: [id number]    Sports Medicine Clinic Visit    PCP: Aicha Hernandez    Haroon Cruz is a 35 year old female who is seen as an ER referral presenting with acute right knee pain.    Injury: Slipped walking on playground equipment, landing with right knee bent underneath self ~ 6 weeks ago (20).  Pain located over right deep anterior lateral knee, nonradiating.  Additional Features:  Positive: swelling, grinding and weakness.  Symptoms are better with Rest and hinged brace.  Symptoms are worse with: bending knee, going from sit to stand, walking.  Other evaluation and/or treatments so far consists of: Ice, Ibuprofen, Rest and ER visit, knee immobilizer, hinged brace.  Recent imaging completed: X-rays completed 20.  Prior History of related problems: none    Social History: unemployed    Interim History - 2020  Since last visit on 10/8/2020 patient has pain in posterior knee and shooting pains up medial side of knee.  Brace has been helpful.  Throbs with the cold.  No interim injury.         Review of Systems  Musculoskeletal: as above  Remainder of review of systems is negative including constitutional, CV, pulmonary, GI, Skin and Neurologic except as noted in HPI or medical history.    Past Medical History:   Diagnosis Date     Lice infestation 2015     Normal vaginal delivery 2015     No past surgical history on file.  No family history on file.    Objective  Resp 12   Ht 1.651 m (5' 5\")   Wt 105.2 kg (232 lb)   BMI 38.61 kg/m        General: healthy, alert and in no distress, obese      HEENT: no scleral icterus or conjunctival erythema     Skin: no " suspicious lesions or rash. No jaundice.     CV: regular rhythm by palpation, 2+ distal pulses, no pedal edema      Resp: normal respiratory effort without conversational dyspnea     Psych: normal mood and affect      Gait: antalgic, appropriate coordination and balance     Neuro: normal light touch sensory exam of the extremities. Motor strength as noted below     Right Knee exam    ROM:        Full active and passive ROM with flexion and extension    Inspection:       no visible ecchymosis       no visible edema or effusion    Skin:       no visible deformities       well perfused       capillary refill brisk    Patellar Motion:        Normal patellar tracking noted through range of motion       Lateral tilt noted in patella       Crepitus noted in the patellofemoral joint    Tender:        Anterolateral knee joint    Non Tender:         remainder of knee area    Special Tests:        positive (+) Kathryn       positive (+) Lachman       positive (+) anterior drawer       neg (-) posterior drawer       neg (-) varus at 0 deg and 30 deg       painful valgus at 30 deg       no pain with forced extension    Evaluation of ipsilateral kinetic chain       normal strength with hip extension and abduction       Decreased right quad tone      Radiology  Results for orders placed or performed during the hospital encounter of 09/01/20   XR Knee Right 1/2 Views    Narrative    EXAM: XR KNEE RT 1 /2 VW  LOCATION: [facility]  DATE/TIME: 9/2/2020 12:22 AM    INDICATION: Injury with fall and lateral right knee pain.  COMPARISON: None.      Impression    IMPRESSION: Normal joint spaces and alignment. No fracture or joint effusion.     Results for orders placed or performed during the hospital encounter of 10/20/20   MR Knee Right w/o Contrast     Value    Radiologist flags Full-thickness tear of the midsubstance fibers of    Narrative    EXAMINATION: MRI of the right knee without contrast    DATE:   10/20/2020    HISTORY: Knee instability    TECHNIQUE: Multiplanar, multisequence MR imaging of the right knee was  obtained using standard sequences in 3 orthogonal planes without the  use of intravenous or intra-articular gadolinium contrast.    Comparison:  Comparison radiographs dated 9/2/2020 were reviewed,  which demonstrated no acute bone abnormality.    FINDINGS:    In the medial compartment, there is soft tissue edema at the junction  of the posterior horn and the posterior capsule, concerning for a  meniscocapsular injury.. There is no high-grade or full-thickness  cartilage loss or subchondral edema.    In the lateral compartment, there is horizontal signal throughout the  anterior horn, body, and posterior horn of the lateral meniscus with  the vertical peripheral tear involving the posterior horn, sagittal  series 6 image 8. There is no high-grade or full-thickness cartilage  loss or subchondral edema.    In the patellofemoral compartment, there is no high-grade or  full-thickness cartilage loss or subchondral edema.    The posterior cruciate ligament is intact.    Extensive bone marrow edema like signal intensity visualized  throughout the knee, including along the posterior aspect of the  medial and lateral tibial plateau, with bone marrow edema along the  medial and lateral femoral condyle. There is full-thickness tear  involving the mid substance fibers of the anterior cruciate ligament  with slight laxity of the more distal ACL fibers. Anterior translation  of the tibia with respect to the femur.    Increased signal around the tibial collateral ligament with high-grade  to full-thickness tear of the superficial fibers of the mid tibial  collateral ligament. There is marked edema in the region of the  meniscal femoral ligament.    The anterior iliotibial band, fibular collateral ligament, biceps  femoris tendon, and popliteus tendons are intact..     There is a small joint effusion. No popliteal  cyst. No joint bodies.    The extensor mechanism is intact and normal in appearance.       Impression    IMPRESSION:  1. Small right knee joint effusion.  2. Extensive bone marrow edema about the right knee including the  posterior aspect of the medial and lateral tibial plateau as well as  along the lateral and medial femoral condyle and throughout the  proximal tibia. There is associated full-thickness tear involving the  mid substance fibers of the anterior cruciate ligament with anterior  translation of the tibia with respect to the femur.  3. High-grade to full-thickness tear involving the superficial fibers  of the right knee tibial collateral ligament with tearing of the deep  meniscofemoral component.  4. Peripheral vertical tear involving the posterior horn of the right  knee lateral meniscus as above.  5. Soft tissue edema at the junction of the posterior horn of the  right knee medial meniscus with the capsular structures, possibly  representing a meniscocapsular injury/ramp lesion.  6. The lateral supporting structures are intact.  7. No high-grade cartilage loss.     [Consider Follow Up: Full-thickness tear of the midsubstance fibers of  the right knee anterior cruciate ligament with additional findings as  above.]    This report will be copied to the [facility] to ensure a  provider acknowledges the finding.     BRIANNA MOSQUEDA MD       Assessment:  1. ACL laxity, right    2. Injury of right knee, subsequent encounter    3. Acute pain of right knee    4. Rupture of anterior cruciate ligament of right knee, subsequent encounter    5. Tear of MCL (medial collateral ligament) of knee, left, subsequent encounter        Plan:  Discussed the assessment with the patient.  Follow up: prn  Detailed review of MRI results today, including ACL tear, MCL injury, meniscocapsular injury  Persistent instability sx, likely related to ACL tear  Continue hinged brace, new brace provided today as her brace  does not fit well  Again recommended to start PT to work on ROM and reconditioning, safe HEP  Offered surgical referral if ACL tear again today, I think it would be good for her to have a consultation to review options, she declined again for now  She continues to be more interested in conservative approach, and we discussed the relative short and long term risks, and I again emphasized the need to work with PT  XR and MR images independently visualized and reviewed with patient today in clinic  Home handouts provided and supportive care reviewed  All questions were answered today  Contact us with additional questions or concerns  Signs and sx of concern reviewed      Moshe Jade DO, CAMEGAN  Primary Care Sports Medicine  [facility]       Time spent in one-on-one evaluation and discussion with patient regarding nature of problem, course, prior treatments, and therapeutic options, at least 50% of which was spent in counseling and coordination of care: 29 minutes        Disclaimer: This note consists of symbols derived from keyboarding, dictation and/or voice recognition software. As a result, there may be errors in the script that have gone undetected. Please consider this when interpreting information found in this chart.      Again, thank you for allowing me to participate in the care of your patient.        Sincerely,        Moshe Jade DO

## 2020-11-03 ENCOUNTER — HOSPITAL ENCOUNTER (OUTPATIENT)
Dept: PHYSICAL THERAPY | Facility: CLINIC | Age: 35
Setting detail: THERAPIES SERIES
End: 2020-11-03
Attending: FAMILY MEDICINE
Payer: MEDICARE

## 2020-11-03 PROCEDURE — 97110 THERAPEUTIC EXERCISES: CPT | Mod: GP | Performed by: PHYSICAL THERAPIST

## 2020-11-03 PROCEDURE — 97116 GAIT TRAINING THERAPY: CPT | Mod: GP | Performed by: PHYSICAL THERAPIST

## 2020-11-10 ENCOUNTER — HOSPITAL ENCOUNTER (OUTPATIENT)
Dept: PHYSICAL THERAPY | Facility: CLINIC | Age: 35
Setting detail: THERAPIES SERIES
End: 2020-11-10
Attending: FAMILY MEDICINE
Payer: MEDICARE

## 2020-11-10 PROCEDURE — 97110 THERAPEUTIC EXERCISES: CPT | Mod: GP | Performed by: PHYSICAL THERAPIST

## 2020-11-17 ENCOUNTER — HOSPITAL ENCOUNTER (OUTPATIENT)
Dept: PHYSICAL THERAPY | Facility: CLINIC | Age: 35
Setting detail: THERAPIES SERIES
End: 2020-11-17
Attending: FAMILY MEDICINE
Payer: MEDICARE

## 2020-11-17 PROCEDURE — 97110 THERAPEUTIC EXERCISES: CPT | Mod: GP | Performed by: PHYSICAL THERAPIST

## 2020-11-24 ENCOUNTER — HOSPITAL ENCOUNTER (OUTPATIENT)
Dept: PHYSICAL THERAPY | Facility: CLINIC | Age: 35
Setting detail: THERAPIES SERIES
End: 2020-11-24
Attending: FAMILY MEDICINE
Payer: MEDICARE

## 2020-11-24 PROCEDURE — 97110 THERAPEUTIC EXERCISES: CPT | Mod: GP | Performed by: PHYSICAL THERAPIST

## 2020-12-01 ENCOUNTER — HOSPITAL ENCOUNTER (OUTPATIENT)
Dept: PHYSICAL THERAPY | Facility: CLINIC | Age: 35
Setting detail: THERAPIES SERIES
End: 2020-12-01
Attending: FAMILY MEDICINE
Payer: MEDICARE

## 2020-12-01 PROCEDURE — 97110 THERAPEUTIC EXERCISES: CPT | Mod: GP | Performed by: PHYSICAL THERAPIST

## 2020-12-08 ENCOUNTER — HOSPITAL ENCOUNTER (OUTPATIENT)
Dept: PHYSICAL THERAPY | Facility: CLINIC | Age: 35
Setting detail: THERAPIES SERIES
End: 2020-12-08
Attending: FAMILY MEDICINE
Payer: MEDICARE

## 2020-12-08 PROCEDURE — 97110 THERAPEUTIC EXERCISES: CPT | Mod: GP | Performed by: PHYSICAL THERAPIST

## 2020-12-22 ENCOUNTER — HOSPITAL ENCOUNTER (OUTPATIENT)
Dept: PHYSICAL THERAPY | Facility: CLINIC | Age: 35
Setting detail: THERAPIES SERIES
End: 2020-12-22
Attending: FAMILY MEDICINE
Payer: MEDICARE

## 2020-12-22 PROCEDURE — 97110 THERAPEUTIC EXERCISES: CPT | Mod: GP | Performed by: PHYSICAL THERAPIST

## 2020-12-29 ENCOUNTER — HOSPITAL ENCOUNTER (OUTPATIENT)
Dept: PHYSICAL THERAPY | Facility: CLINIC | Age: 35
Setting detail: THERAPIES SERIES
End: 2020-12-29
Attending: FAMILY MEDICINE
Payer: MEDICARE

## 2020-12-29 PROCEDURE — 97110 THERAPEUTIC EXERCISES: CPT | Mod: GP | Performed by: PHYSICAL MEDICINE & REHABILITATION

## 2021-01-12 ENCOUNTER — HOSPITAL ENCOUNTER (OUTPATIENT)
Dept: PHYSICAL THERAPY | Facility: CLINIC | Age: 36
Setting detail: THERAPIES SERIES
End: 2021-01-12
Attending: FAMILY MEDICINE
Payer: MEDICARE

## 2021-01-12 PROCEDURE — 97110 THERAPEUTIC EXERCISES: CPT | Mod: GP | Performed by: PHYSICAL THERAPIST

## 2021-01-12 NOTE — PROGRESS NOTES
Outpatient Physical Therapy Progress Note     Patient: Haroon Cruz  : 1985    Beginning/End Dates of Reporting Period:  10/22/20 to 2021    Referring Provider: Moshe Cárdenas Diagnosis: decreased R LE strength following ACL tear     Client Self Report: (P) Thinking about having ACL surgery now. Twisting, walking long distances, kneeling and getting in/out of the bathtub are still very difficult and painful at times. Also very difficult and sore to walk in snow     Objective Measurements:  Objective Measure: (P) squats  Details: (P) anterior translation of knee, needs cues to keep butt back  Objective Measure: (P) right LE strength  Details: (P) hip flexiono 4+/5, knee flex 4+/5, knee extension 4+/5, hip abduction 4/5   Objective Measure: (P) knee ROM   Details: (P) 5-0-125     Goals:  Goal Identifier (P) 1   Goal Description (P) Patient will be able to take 3 steps without knee brace on and feel confident knee won't buckle in order to transfer short distances.    Target Date (P) 20   Date Met  (P) 21   Progress:     Goal Identifier (P) 2   Goal Description (P) Patient will be able to complete 30 SLR in a row without fatigue in order to improve quad control for walking.    Target Date (P) 20   Date Met  (P) 20   Progress:     Goal Identifier (P) 3   Goal Description (P) Patient will be able to ascend/descend  steps with 1 railing with reciprocal gait pattern without any feelings of knee buckling.    Target Date (P) 21   Date Met  (P) (can ascend/descend but feels unsteady still )   Progress:     Goal Identifier (P) 4   Goal Description (P) Patient will report no buckling of the right knee during daily chores and activites.    Target Date (P) 21   Date Met  (P) (occasionally lake maybe 1-2 times per week)   Progress:       Progress Toward Goals:   Progress this reporting period: Patient has been showing improved strength and control in her right knee.  She continues to have some buckling and difficulties with household activities but overall showing improvements. Can complete stairs with less pain and improved confidence with ascending. She is still having difficulties with twisting, kneeling and standing/walking on uneven surfaces. Would benefit from continued PT to further improve LE strength and balance       Plan:  Continue therapy per current plan of care.    Discharge:  No          RECERTIFICATION    Haroon Cruz  1985    Session Number: 10 since start of care.    Reasons for Continuing Treatment: to further improve right LE strength and allow for normalized function with ADLS, household chores and mobility    Frequency/Duration  1 times per week for 4 weeks for a total of 4 visits.    Recertification Period  1/12/21 - 2/12/21    Physician Signature:    Date:    X_______________________________________________________    Physician Name: Moshe Jade    I certify the need for these services furnished under this plan of treatment and while under my care. Physician co-signature of this document indicates review and certification of the therapy plan.  This signature may be written on paper, or electronically signed within EPIC.

## 2021-01-26 ENCOUNTER — HOSPITAL ENCOUNTER (OUTPATIENT)
Dept: PHYSICAL THERAPY | Facility: CLINIC | Age: 36
Setting detail: THERAPIES SERIES
End: 2021-01-26
Attending: FAMILY MEDICINE
Payer: MEDICARE

## 2021-01-26 PROCEDURE — 97110 THERAPEUTIC EXERCISES: CPT | Mod: GP | Performed by: PHYSICAL THERAPIST

## 2021-02-02 ENCOUNTER — HOSPITAL ENCOUNTER (OUTPATIENT)
Dept: PHYSICAL THERAPY | Facility: CLINIC | Age: 36
Setting detail: THERAPIES SERIES
End: 2021-02-02
Attending: FAMILY MEDICINE
Payer: MEDICARE

## 2021-02-02 PROCEDURE — 97110 THERAPEUTIC EXERCISES: CPT | Mod: GP | Performed by: PHYSICAL THERAPIST

## 2021-02-02 NOTE — Clinical Note
Roland Jade,   Please see the discharge note for Mallissa regarding her ACL tear. She is currently doing well and I told her to f/u with you only if she had continuing issues or if she heard otherwise from your team.    Thanks for this referral,     Bindu Reynaga  PT, DPT       2/2/2021   [address] [address]  [email or web address]  Voicemail: [phone]

## 2021-02-02 NOTE — PROGRESS NOTES
Outpatient Physical Therapy Discharge Note     Patient: Haroon Cruz  : 1985    Beginning/End Dates of Reporting Period:  10/22/20 to 2021    Referring Provider: Moshe Cárdenas Diagnosis: decreased right knee ROM following ACL tear     Client Self Report: was doing a deep squat to get into the bottom cupboard to look for something and had trouble standing back up. Overall though, nothing bothering as far as pain goes. No buckling in the knee. Stairs are going well with no pain ascending or descending. hasn't been doing too much walking due to icy conditions.     Objective Measurements:  Objective Measure: stairs  Details: ascend/descend no railing with no buckling   Objective Measure: right LE strength  Details: hip flexion 4+/5, knee flex 4+/5, knee extension 4+/5, hip abduction 4/5   Objective Measure: knee ROM   Details: 5-0-125  Objective Measure: carry 10#   Details: able to ascend/descend stairs no buckling with reciprocal pattern         Outcome Measures (most recent score):  LEFS: (higher score indicates improved function)   10/22/20:  37/80  21: 48/80    Goals:  Goal Identifier 1   Goal Description Patient will be able to take 3 steps without knee brace on and feel confident knee won't buckle in order to transfer short distances.    Target Date 20   Date Met  21   Progress:     Goal Identifier 2   Goal Description Patient will be able to complete 30 SLR in a row without fatigue in order to improve quad control for walking.    Target Date 20   Date Met  20   Progress:     Goal Identifier 3   Goal Description Patient will be able to ascend/descend  steps with 1 railing with reciprocal gait pattern without any feelings of knee buckling.    Target Date 21   Date Met  21   Progress:     Goal Identifier 4   Goal Description Patient will report no buckling of the right knee during daily chores and activites.    Target Date 21   Date Met   02/02/21   Progress:       Progress Toward Goals:   Progress this reporting period: AT this point, patient has met all of her therapy goals. She is not having any pain or difficulties with her day to day activities. Her knee ROM is WNL with no pain at end range. She still has some instances of the knee popping in sensation but usually does not have any pain that lasts. She is still considering the idea of surgery down the road but she is going to wait to see how her knee feels for the next few months. Patient has a good HEP that she can continue working on strength and balance at home so no further OP PT is currently indicated.           Plan:  Discharge from therapy.    Discharge:    Reason for Discharge: Patient has met all goals.  No further expectation of progress.    Equipment Issued: theraband    Discharge Plan: Patient to continue home program.      Bindu Reynaga  PT, DPT       2/2/2021   [address]   [address] [place] [id number]  [email or web address]  Voicemail: [phone]

## 2021-06-22 ENCOUNTER — TELEPHONE (OUTPATIENT)
Dept: ORTHOPEDICS | Facility: CLINIC | Age: 36
End: 2021-06-22

## 2021-06-22 DIAGNOSIS — S83.511D RUPTURE OF ANTERIOR CRUCIATE LIGAMENT OF RIGHT KNEE, SUBSEQUENT ENCOUNTER: Primary | ICD-10-CM

## 2021-06-22 DIAGNOSIS — S83.412D TEAR OF MCL (MEDIAL COLLATERAL LIGAMENT) OF KNEE, LEFT, SUBSEQUENT ENCOUNTER: ICD-10-CM

## 2021-06-22 DIAGNOSIS — S89.91XD INJURY OF RIGHT KNEE, SUBSEQUENT ENCOUNTER: ICD-10-CM

## 2021-06-22 NOTE — TELEPHONE ENCOUNTER
Moshe Jade DO Henry, Ryan D, ATC 2 hours ago (10:00 AM)     Knee sleeve and not knee immobilizer?  ACL brace ok with me if needed for instability concerns.     Please reach out to clarify, thanks.     Moshe Jade DO, LOIS   Sports Medicine Physician   [facility]

## 2021-06-22 NOTE — TELEPHONE ENCOUNTER
Reason for Call:  Other     Detailed comments: Pt wants to know how she can wash her knee immobilizer.     Is also questioning if there may be a lighter brace that she could use that doesn't cause her knee to get so warm.     Please contact pt     Phone Number Patient can be reached at: Cell number on file:    Telephone Information:   Mobile [phone]       Best Time: Any    Can we leave a detailed message on this number? YES    Call taken on 6/22/2021 at 9:14 AM by Denise Behrendt

## 2021-06-22 NOTE — TELEPHONE ENCOUNTER
Per last office note, patient declined referral to orthopedic surgeon.   Pt completed as of 2/2/21.  Please advise if ok to place referral for ACL brace or follow up to discuss    Karey Cruz MS, ATC

## 2021-06-22 NOTE — TELEPHONE ENCOUNTER
"Called and spoke with patient.  Question about cleaning was for a knee sleeve.  Discussed hand washing her knee sleeve and allowing to air dry.     She feels the pull on knee sleeve is too hot in the summer.  Discussed ACL brace, fitted by an orthotist.  She is interested as she would like something \"less hot\" for the summer and use her pull on hinged knee brace in the winter.  Order placed for DME thru [facility]otics [facility] prosthetics.   Please sign if in agreement.     Karey Cruz MS ATC    "

## 2021-09-18 ENCOUNTER — HOSPITAL ENCOUNTER (EMERGENCY)
Facility: CLINIC | Age: 36
Discharge: HOME | End: 2021-09-18
Attending: EMERGENCY MEDICINE | Admitting: EMERGENCY MEDICINE
Payer: MEDICARE

## 2021-09-18 VITALS
WEIGHT: 255 LBS | TEMPERATURE: 97.2 F | BODY MASS INDEX: 42.43 KG/M2 | HEART RATE: 93 BPM | SYSTOLIC BLOOD PRESSURE: 118 MMHG | RESPIRATION RATE: 16 BRPM | OXYGEN SATURATION: 94 % | DIASTOLIC BLOOD PRESSURE: 86 MMHG

## 2021-09-18 DIAGNOSIS — Z20.822 SUSPECTED COVID-19 VIRUS INFECTION: ICD-10-CM

## 2021-09-18 DIAGNOSIS — U07.1 2019 NOVEL CORONAVIRUS DISEASE (COVID-19): ICD-10-CM

## 2021-09-18 LAB — SARS-COV-2 RNA RESP QL NAA+PROBE: POSITIVE

## 2021-09-18 PROCEDURE — 250N000011 HC RX IP 250 OP 636: Performed by: EMERGENCY MEDICINE

## 2021-09-18 PROCEDURE — 99284 EMERGENCY DEPT VISIT MOD MDM: CPT | Performed by: EMERGENCY MEDICINE

## 2021-09-18 PROCEDURE — 87635 SARS-COV-2 COVID-19 AMP PRB: CPT | Performed by: EMERGENCY MEDICINE

## 2021-09-18 PROCEDURE — C9803 HOPD COVID-19 SPEC COLLECT: HCPCS | Performed by: EMERGENCY MEDICINE

## 2021-09-18 PROCEDURE — M0243 CASIRIVI AND IMDEVI INFUSION: HCPCS

## 2021-09-18 RX ADMIN — CASIRIVIMAB AND IMDEVIMAB 1200 MG: 600; 600 INJECTION, SOLUTION, CONCENTRATE INTRAVENOUS at 19:37

## 2021-09-18 NOTE — ED PROVIDER NOTES
Emergency Department Patient Sign-out       Brief HPI:  This is a 36 year old female signed out to me by Dr. Dawn.  See initial ED Provider note for details of the presentation.  Any significant events that occurred prior to my assuming care were also reviewed.     Exam:   Patient Vitals for the past 24 hrs:   BP Temp Temp src Pulse Resp SpO2 Weight   09/18/21 2030 120/87 -- -- 91 -- 93 % --   09/18/21 2015 (!) 123/94 -- -- 97 -- 92 % --   09/18/21 2000 121/88 -- -- 95 -- 94 % --   09/18/21 1945 121/86 -- -- 99 -- 93 % --   09/18/21 1930 -- -- -- -- -- 91 % --   09/18/21 1915 -- -- -- -- -- 90 % --   09/18/21 1900 -- -- -- -- -- 91 % --   09/18/21 1845 -- -- -- -- -- 92 % --   09/18/21 1830 -- -- -- -- -- 92 % --   09/18/21 1815 -- -- -- -- -- 93 % --   09/18/21 1800 -- -- -- -- -- 93 % --   09/18/21 1755 -- -- -- -- -- 93 % --   09/18/21 1752 -- -- -- -- -- 92 % --   09/18/21 1619 120/77 97.2  F (36.2  C) Temporal 115 16 92 % 115.7 kg (255 lb)           ED RESULTS:   Results for orders placed or performed during the hospital encounter of 09/18/21 (from the past 24 hour(s))   Symptomatic COVID-19 Virus (Coronavirus) by PCR Nasopharyngeal     Status: Abnormal    Collection Time: 09/18/21  5:23 PM    Specimen: Nasopharyngeal; Swab   Result Value Ref Range    SARS CoV2 PCR Positive (A) Negative    Narrative    Testing was performed using the [employer]ar  SARS-CoV-2 & Influenza A/B Assay on the [employer]  System.  This test should be ordered for the detection of SARS-COV-2 in individuals who meet SARS-CoV-2 clinical and/or epidemiological criteria. Test performance is unknown in asymptomatic patients.  This test is for in vitro diagnostic use under the FDA EUA for laboratories certified under CLIA to perform moderate and/or high complexity testing. This test has not been FDA cleared or approved.  A negative test does not rule out the presence of PCR inhibitors in the specimen or target RNA in concentration below the  limit of detection for the assay. The possibility of a false negative should be considered if the patient's recent exposure or clinical presentation suggests COVID-19.  [facility] are certified under the Clinical Laboratory Improvement Amendments of 1988 (CLIA-88) as qualified to perform moderate and/or high complexity laboratory testing.       ED MEDICATIONS:   Medications   casirivimab-imdevimab (REGEN-COV) injection 1,200 mg (1,200 mg Intravenous Given 9/18/21 1937)       ED COURSE:  1845.  The patient is here with COVID-19.  She is going to receive monoclonal antibody subcutaneously and then discharged home.  There will be an hour observation after the medication is given.    2046.  Patient here for observation after injections, no complications.  Patient discharged home.    Impression:    ICD-10-CM    1. 2019 novel coronavirus disease (COVID-19)  U07.1 COVID-19 [facility] Referral     Care Coordination Referral   2. Suspected COVID-19 virus infection  Z20.822          MD Christine Barriga Jason M, MD  09/18/21 2046

## 2021-09-18 NOTE — ED PROVIDER NOTES
History     Chief Complaint   Patient presents with     Diarrhea     Cough     Shortness of Breath     HPI  Haroon Cruz is a 36 year old female who presents to the emergency department for evaluation of mild nonproductive cough, shortness of breath, diarrhea, fatigue and malaise.  Day 5 of illness.  No chest pain, hemoptysis, leg pain, leg swelling, fever or chills.  No known infectious exposures or recent travel.  She has not been vaccinated versus COVID-19.  She does not smoke.  She reports a history of asthma and has neb solution but no mouthpiece or tubing for neb treatments.      Allergies:  Allergies   Allergen Reactions     Excedrin Extra Strength Nausea and Vomiting     Sumatriptan Nausea and Vomiting     Adhesive Tape Rash       Problem List:    Patient Active Problem List    Diagnosis Date Noted     Normal vaginal delivery 02/01/2015     Priority: Medium     Lice infestation 02/01/2015     Priority: Medium        Past Medical History:    Past Medical History:   Diagnosis Date     Lice infestation 2/1/2015     Normal vaginal delivery 2/1/2015       Past Surgical History:    History reviewed. No pertinent surgical history.    Family History:    History reviewed. No pertinent family history.    Social History:  Marital Status:  Single [1]  Social History     Tobacco Use     Smoking status: Former Smoker     Smokeless tobacco: Current User     Tobacco comment: pt uses chewing tobacco and ecigarettes all pregnancy   Substance Use Topics     Alcohol use: No     Drug use: No        Medications:    docusate sodium (COLACE) 100 MG capsule  fluticasone (FLONASE) 50 MCG/ACT nasal spray  ibuprofen (ADVIL,MOTRIN) 400-800 mg tablet  lanolin ointment  mometasone-formoterol (DULERA) 100-5 MCG/ACT oral inhaler  Prenatal Vit-Fe Fumarate-FA (PRENATAL VITAMINS) 28-0.8 MG TABS        Review of Systems  As mentioned above in the history present illness.  All other systems were reviewed and are negative.    Physical Exam    BP: 120/77  Pulse: 115  Temp: 97.2  F (36.2  C)  Resp: 16  Weight: 115.7 kg (255 lb)  SpO2: 92 %      Physical Exam  Vitals and nursing note reviewed.   Constitutional:       General: She is not in acute distress.     Appearance: Normal appearance. She is well-developed. She is not ill-appearing or diaphoretic.   HENT:      Head: Normocephalic and atraumatic.      Right Ear: External ear normal.      Left Ear: External ear normal.      Nose: Nose normal.      Mouth/Throat:      Mouth: Mucous membranes are moist.   Eyes:      General: No scleral icterus.     Extraocular Movements: Extraocular movements intact.      Conjunctiva/sclera: Conjunctivae normal.   Neck:      Trachea: No tracheal deviation.   Cardiovascular:      Rate and Rhythm: Regular rhythm. Tachycardia present.      Heart sounds: Normal heart sounds. No murmur heard.   No friction rub. No gallop.       Comments: Heart rate 110 at time of my exam.  Pulmonary:      Effort: Pulmonary effort is normal. No tachypnea, accessory muscle usage or respiratory distress.      Breath sounds: Examination of the left-lower field reveals rhonchi and rales. Rhonchi and rales present. No decreased breath sounds or wheezing.      Comments: O2 saturation % on room air at time of my examination.  Abdominal:      General: There is no distension.   Musculoskeletal:         General: No tenderness. Normal range of motion.      Cervical back: Normal range of motion and neck supple.      Right lower leg: No tenderness. No edema.      Left lower leg: No tenderness. No edema.   Skin:     General: Skin is warm and dry.      Coloration: Skin is not cyanotic or pale.      Findings: No erythema or rash.   Neurological:      General: No focal deficit present.      Mental Status: She is alert and oriented to person, place, and time.      Coordination: Coordination normal.   Psychiatric:         Mood and Affect: Mood normal.         Behavior: Behavior normal.         ED Course         Procedures                Results for orders placed or performed during the hospital encounter of 09/18/21 (from the past 24 hour(s))   Symptomatic COVID-19 Virus (Coronavirus) by PCR Nasopharyngeal    Specimen: Nasopharyngeal; Swab   Result Value Ref Range    SARS CoV2 PCR Positive (A) Negative    Narrative    Testing was performed using the c[employer]ar  SARS-CoV-2 & Influenza A/B Assay on the c[employer]  Luz  System.  This test should be ordered for the detection of SARS-COV-2 in individuals who meet SARS-CoV-2 clinical and/or epidemiological criteria. Test performance is unknown in asymptomatic patients.  This test is for in vitro diagnostic use under the FDA EUA for laboratories certified under CLIA to perform moderate and/or high complexity testing. This test has not been FDA cleared or approved.  A negative test does not rule out the presence of PCR inhibitors in the specimen or target RNA in concentration below the limit of detection for the assay. The possibility of a false negative should be considered if the patient's recent exposure or clinical presentation suggests COVID-19.  [facility] are certified under the Clinical Laboratory Improvement Amendments of 1988 (CLIA-88) as qualified to perform moderate and/or high complexity laboratory testing.       Medications   casirivimab-imdevimab (REGEN-COV) injection 1,200 mg (has no administration in time range)       Assessments & Plan (with Medical Decision Making)   36 year old female who presents to the emergency department for evaluation of mild nonproductive cough, shortness of breath, diarrhea, fatigue and malaise x 5 days.  COVID-19 nasopharyngeal study is positive, performed emergently because she appeared on the verge of requiring admission, and meeting criteria for monoclonal antibody therapy. O2 saturations on room air varied between 91% to 100% on room air.  She was observed in the ED and did not have any O2 desaturations at rest in  bed.  Currently we have no hospital beds available here or within the [facility] network.  No respiratory distress or hypoxia at the present time and she meets criteria for administration of monoclonal antibody therapy in the ED.  This was provided prior to discharge: Casirivamab-Imdevimab (Regen-COV) monoclonal antibody therapy (1,200 mg SQ in 4 divided doses, 600 mg of each Ab).   She appears stable for discharge home with an O2 sat monitor and instructions on its use and referral to the COVID-19 get well loop and clinic follow-up referral, but it appears at high risk to worsen and need to return for admission.  She was given very careful discharge instructions and instructions on quarantine and supportive care.  I gave her a neb mouthpiece and tubing for home neb use in an isolated closed room in the home, if needed due to her history of asthma.  She currently has no wheezing, decreased air movement or prolonged expiratory phase or clinical signs of bronchospasm.  No current indication for steroid/dexamethasone use.  She was provided instructions for supportive care and will return as needed for worsened condition or worsening symptoms, or new problems or concerns.      I have reviewed the nursing notes.    I have reviewed the findings, diagnosis, plan and need for follow up with the patient.    New Prescriptions    No medications on file       Final diagnoses:   2019 novel coronavirus disease (COVID-19)   Suspected COVID-19 virus infection       9/18/2021   [facility] EMERGENCY DEPT     Noah Dawn MD  09/18/21 1922

## 2021-09-19 ENCOUNTER — PATIENT OUTREACH (OUTPATIENT)
Dept: CARE COORDINATION | Facility: CLINIC | Age: 36
End: 2021-09-19

## 2021-09-19 NOTE — TELEPHONE ENCOUNTER
Clinic Care Coordination Contact  [facility]/Voicemail      Clinical Data: Care Coordinator Outreach  Outreach attempted x 1.  Left message on patient's voicemail with 24/[facility] 24/7 Nurse Line  Plan: Care Coordinator will try to reach patient again in 1-2 business days.    Sasha Mccarthy, RN  [facility]

## 2021-09-19 NOTE — ED NOTES
Patient resting. Denies any immediate signs of reaction immediately after injections. No needs at this time.

## 2021-09-20 NOTE — PROGRESS NOTES
Second phone attempt to reach pt on home virtual monitoring program for COVID-19, no answer. Left message asking pt to make f/u appt for tomorrow and to participate in GetWell Loop. Left phone number for nurse triage.

## 2021-10-18 ENCOUNTER — NURSE TRIAGE (OUTPATIENT)
Dept: NURSING | Facility: CLINIC | Age: 36
End: 2021-10-18

## 2021-10-18 ENCOUNTER — HOSPITAL ENCOUNTER (EMERGENCY)
Facility: CLINIC | Age: 36
Discharge: HOME | End: 2021-10-18
Attending: PHYSICIAN ASSISTANT | Admitting: PHYSICIAN ASSISTANT
Payer: MEDICARE

## 2021-10-18 VITALS
OXYGEN SATURATION: 96 % | SYSTOLIC BLOOD PRESSURE: 141 MMHG | HEART RATE: 105 BPM | HEIGHT: 65 IN | WEIGHT: 255 LBS | TEMPERATURE: 98.1 F | BODY MASS INDEX: 42.49 KG/M2 | DIASTOLIC BLOOD PRESSURE: 106 MMHG | RESPIRATION RATE: 12 BRPM

## 2021-10-18 DIAGNOSIS — Z86.16 HISTORY OF 2019 NOVEL CORONAVIRUS DISEASE (COVID-19): ICD-10-CM

## 2021-10-18 DIAGNOSIS — Z71.1 WORRIED WELL: ICD-10-CM

## 2021-10-18 PROCEDURE — 99284 EMERGENCY DEPT VISIT MOD MDM: CPT | Performed by: PHYSICIAN ASSISTANT

## 2021-10-18 PROCEDURE — 99283 EMERGENCY DEPT VISIT LOW MDM: CPT | Performed by: PHYSICIAN ASSISTANT

## 2021-10-18 RX ORDER — NYSTATIN 100000 U/G
CREAM TOPICAL
COMMUNITY
Start: 2021-06-08

## 2021-10-18 RX ORDER — METHOCARBAMOL 750 MG/1
TABLET ORAL
COMMUNITY
Start: 2020-12-15

## 2021-10-18 RX ORDER — NYSTATIN 100000 U/G
CREAM TOPICAL
COMMUNITY
Start: 2021-06-09

## 2021-10-18 RX ORDER — CETIRIZINE HYDROCHLORIDE 10 MG/1
10 TABLET ORAL
COMMUNITY
Start: 2020-05-27

## 2021-10-18 RX ORDER — NYSTATIN 100000 [USP'U]/G
POWDER TOPICAL
COMMUNITY
Start: 2021-06-08

## 2021-10-18 RX ORDER — POLYETHYLENE GLYCOL 400 AND PROPYLENE GLYCOL 4; 3 MG/ML; MG/ML
1 SOLUTION/ DROPS OPHTHALMIC
COMMUNITY
Start: 2020-05-27

## 2021-10-18 RX ORDER — ALBUTEROL SULFATE 0.83 MG/ML
2.5 SOLUTION RESPIRATORY (INHALATION)
COMMUNITY
Start: 2021-10-14

## 2021-10-18 RX ORDER — ALBUTEROL SULFATE 90 UG/1
AEROSOL, METERED RESPIRATORY (INHALATION)
COMMUNITY
Start: 2021-10-14

## 2021-10-18 RX ORDER — NYSTATIN 100000 [USP'U]/G
1 POWDER TOPICAL
COMMUNITY
Start: 2021-06-08

## 2021-10-18 ASSESSMENT — ENCOUNTER SYMPTOMS
MUSCULOSKELETAL NEGATIVE: 1
CONSTITUTIONAL NEGATIVE: 1
CARDIOVASCULAR NEGATIVE: 1
EYES NEGATIVE: 1
RESPIRATORY NEGATIVE: 1
PSYCHIATRIC NEGATIVE: 1
LIGHT-HEADEDNESS: 1
GASTROINTESTINAL NEGATIVE: 1

## 2021-10-18 ASSESSMENT — MIFFLIN-ST. JEOR: SCORE: 1842.55

## 2021-10-18 NOTE — DISCHARGE INSTRUCTIONS
May use pulse oximeter as needed to monitor pulse and oxygen saturation.  The pulse oximeter does not measure blood pressure.  Normal pulse ranges between 60 and 100 bpm.  Oxygen saturation typically ranges from 95 to 100%.  Given your history of COVID-19 and asthma, I would recommend using your albuterol inhaler as needed every 4 hours.  Would also recommend good oral hydration with an electrolyte solution such as Pedialyte for concerns such as lightheadedness or dizziness.  No concerns for current COVID-19 infection given you are not having symptoms currently and it has been 1 month since being diagnosed with COVID-19.

## 2021-10-18 NOTE — ED PROVIDER NOTES
History     Chief Complaint   Patient presents with     Generalized Weakness     stsates one month post covid - states was a little lightheaded today and wanted to be seen in ED      HPI  Haroon Cruz is a 36 year old female who presents with concerns of intermittent lightheadedness and fluctuating blood pressure which began on October 12, 2021.  The patient was previously diagnosed with COVID-19 on September 18, 2021.  She was seen and evaluated at [facility] emergency department, was given monoclonal antibodies at that time.  The patient does have a past medical history of asthma, she currently uses her albuterol inhaler twice per day, albuterol nebulizer at night, and Advair diskus twice per day with good relief of symptoms.  States that she noticed her blood pressure fluctuating today as well.  No current concerns for lightheadedness.  No concerns for breathing or swallowing, chest pain, shortness of breath, abdominal pain, joint pain or rashes, headaches, acute vision changes, fever or chills, nausea or vomiting, constipation or diarrhea, or leg pain/swelling. Normal bowel and bladder function.  She has not been vaccinated for COVID-19.    Allergies:  Allergies   Allergen Reactions     Excedrin Extra Strength Nausea and Vomiting     Sumatriptan Nausea and Vomiting     Adhesive Tape Rash       Problem List:    Patient Active Problem List    Diagnosis Date Noted     Normal vaginal delivery 02/01/2015     Priority: Medium     Lice infestation 02/01/2015     Priority: Medium        Past Medical History:    Past Medical History:   Diagnosis Date     Lice infestation 2/1/2015     Normal vaginal delivery 2/1/2015       Past Surgical History:    No past surgical history on file.    Family History:    No family history on file.    Social History:  Marital Status:  Single [1]  Social History     Tobacco Use     Smoking status: Former Smoker     Smokeless tobacco: Current User     Tobacco comment: pt uses  "chewing tobacco and ecigarettes all pregnancy   Substance Use Topics     Alcohol use: No     Drug use: No        Medications:    albuterol (PROVENTIL) (2.5 MG/3ML) 0.083% neb solution  cetirizine (ZYRTEC) 10 MG tablet  nystatin (MYCOSTATIN) 572086 UNIT/GM external cream  nystatin (MYCOSTATIN) 305846 UNIT/GM external powder  polyethylene glycol-propylene glycol (SYSTANE) 0.4-0.3 % SOLN ophthalmic solution  ADVAIR DISKUS 250-50 MCG/DOSE inhaler  albuterol (PROAIR HFA/PROVENTIL HFA/VENTOLIN HFA) 108 (90 Base) MCG/ACT inhaler  D3-50 1.25 MG (70766 UT) capsule  docusate sodium (COLACE) 100 MG capsule  fluticasone (FLONASE) 50 MCG/ACT nasal spray  ibuprofen (ADVIL,MOTRIN) 400-800 mg tablet  lanolin ointment  mometasone-formoterol (DULERA) 100-5 MCG/ACT oral inhaler  nystatin (MYCOSTATIN) 559034 UNIT/GM external cream  nystatin (MYCOSTATIN) 754228 UNIT/GM external powder  Prenatal Vit-Fe Fumarate-FA (PRENATAL VITAMINS) 28-0.8 MG TABS          Review of Systems   Constitutional: Negative.    HENT: Negative.    Eyes: Negative.    Respiratory: Negative.    Cardiovascular: Negative.    Gastrointestinal: Negative.    Genitourinary: Negative.    Musculoskeletal: Negative.    Neurological: Positive for light-headedness.   Psychiatric/Behavioral: Negative.        Physical Exam   BP: (!) 151/94  Pulse: 105  Temp: 98.1  F (36.7  C)  Resp: 12  Height: 165.1 cm (5' 5\")  Weight: 115.7 kg (255 lb)  SpO2: 97 %      Physical Exam  Constitutional:       General: She is not in acute distress.     Appearance: Normal appearance. She is not ill-appearing, toxic-appearing or diaphoretic.   HENT:      Head: Normocephalic and atraumatic.      Right Ear: Tympanic membrane, ear canal and external ear normal. No middle ear effusion. There is no impacted cerumen. No mastoid tenderness. Tympanic membrane is not injected, perforated, erythematous, retracted or bulging.      Left Ear: Tympanic membrane, ear canal and external ear normal.  No middle ear " effusion. There is no impacted cerumen. No mastoid tenderness. Tympanic membrane is not injected, perforated, erythematous, retracted or bulging.      Nose: Nose normal. No congestion or rhinorrhea.      Mouth/Throat:      Mouth: Mucous membranes are moist.      Pharynx: No oropharyngeal exudate or posterior oropharyngeal erythema.   Eyes:      General: No scleral icterus.        Right eye: No discharge.         Left eye: No discharge.      Extraocular Movements: Extraocular movements intact.      Conjunctiva/sclera: Conjunctivae normal.   Cardiovascular:      Rate and Rhythm: Normal rate and regular rhythm.      Pulses: Normal pulses.      Heart sounds: Normal heart sounds. No murmur heard.     Pulmonary:      Effort: Pulmonary effort is normal. No respiratory distress.      Breath sounds: Normal breath sounds. No stridor. No wheezing or rhonchi.   Abdominal:      General: Bowel sounds are normal. There is no distension.      Tenderness: There is no abdominal tenderness. There is no guarding.   Musculoskeletal:      Cervical back: Neck supple. No rigidity. No muscular tenderness.   Lymphadenopathy:      Cervical: No cervical adenopathy.      Right cervical: No superficial, deep or posterior cervical adenopathy.     Left cervical: No superficial, deep or posterior cervical adenopathy.   Skin:     General: Skin is warm and dry.   Neurological:      General: No focal deficit present.      Mental Status: She is alert and oriented to person, place, and time.      Cranial Nerves: No cranial nerve deficit.      Sensory: No sensory deficit.      Motor: No weakness.      Coordination: Coordination normal.      Gait: Gait normal.      Deep Tendon Reflexes: Reflexes normal.   Psychiatric:         Mood and Affect: Mood normal.         Behavior: Behavior normal.         Thought Content: Thought content normal.         Judgment: Judgment normal.         ED Course        Procedures                  No results found for this or  any previous visit (from the past 24 hour(s)).    Medications - No data to display    Assessments & Plan (with Medical Decision Making)   The patient is a 36 year old female who presents with concerns of intermittent lightheadedness and fluctuating blood pressure which began on October 12, 2021.  The patient was previously diagnosed with COVID-19 on September 18, 2021.  She was seen and evaluated at [facility] [place] emergency department, was given monoclonal antibodies at that time.  Has no lightheadedness currently.  Denies any trauma.  The patient was mistakenly measuring her blood pressure using a pulse oximeter.  Explained the use of a pulse oximeter and demonstrated oxygen saturation and pulse so the patient clearly understood how to use it.  She has no symptoms consistent with high blood pressure or low blood pressure such as loss of consciousness, palpitations, chest pain, shortness of breath, pain radiating to the shoulder or down the left arm, dizziness, numbness or tingling, nausea or vomiting, or weakness.    Normal physical exam, including neurologic exam today.  No current concerns with shortness of breath or pulmonary function.  The patient's current asthma regimen is working well for asthma control.     May use pulse oximeter as needed to monitor pulse and oxygen saturation.  The pulse oximeter does not measure blood pressure.  Normal pulse ranges between 60 and 100 bpm.  Oxygen saturation typically ranges from 95 to 100%.  Given your history of COVID-19 and asthma, I would recommend using your albuterol inhaler as needed every 4 hours.  Would also recommend good oral hydration with an electrolyte solution such as Pedialyte for concerns such as lightheadedness or dizziness.  No concerns for current COVID-19 infection given you are not having symptoms currently and it has been 1 month since being diagnosed with COVID-19.    Return to clinic for further evaluation if you develop fever of 104 degrees F or  greater, chest pain, difficulty breathing, shortness of breath, difficulty swallowing, lightheadedness or dizziness.  Severe headache, numbness or tingling, dizziness or lightheadedness, acute vision changes, acutely worsening rashes, or severe abdominal pain.    I have reviewed the nursing notes.    I have reviewed the findings, diagnosis, plan and need for follow up with the patient.      Discharge Medication List as of 10/18/2021  4:14 PM          Final diagnoses:   History of 2019 novel coronavirus disease (COVID-19)   Worried well       10/18/2021   [facility] EMERGENCY DEPT     Tan Voss PA-C  10/18/21 [id number]

## 2021-10-19 NOTE — TELEPHONE ENCOUNTER
Pt called stating she was seen today at the emergency room and her ogxgen is bouncing up and down, 98 to 90. Pt stated her oxygen falcates from 98  97, 96, 92 to 91. Pt stated she is having difficulty breathing and stated it is hard to get her breathing back up when it drops. Pt states she has asthma and in the last 3 hours she did 2 neb treatment with her albuterol. Pt stated she is at her frined home and she for get her to two inhalers,  ADVAIR DISKUS 250-50 MCG/DOSE inhaler, albuterol (PROAIR HFA/PROVENTIL HFA/VENTOLIN HFA) 108 (90 Base. Pt stated she has not taken a nap yet to day and she cant lay down to sleep.      Pt stated she was talking her  over the phone and told her she is breathing heavier. Pt stated her temp is 98.0.      Per protocal pt was advised to call 911 and she stated okay.      Julio Cesar Ruffin RN  [facility]       COVID 19 Nurse Triage Plan/Patient Instructions    Please be aware that novel coronavirus (COVID-19) may be circulating in the community. If you develop symptoms such as fever, cough, or SOB or if you have concerns about the presence of another infection including coronavirus (COVID-19), please contact your health care provider or visit [email or web address].     Disposition/Instructions    Call to EMS/911 recommended. Follow protocol based instructions.     Bring Your Own Device:  Please also bring your smart device(s) (smart phones, tablets, laptops) and their charging cables for your personal use and to communicate with your care team during your visit.    Thank you for taking steps to prevent the spread of this virus.  o Limit your contact with others.  o Wear a simple mask to cover your cough.  o Wash your hands well and often.    Resources    [facility]: About COVID-19: [email or web address]    CDC: What to Do If You're Sick: www.cdc.gov/coronavirus/2019-ncov/about/steps-when-sick.html    CDC: Ending Home Isolation:  www.cdc.gov/coronavirus/2019-ncov/hcp/disposition-in-home-patients.html     CDC: Caring for Someone: www.cdc.gov/coronavirus/2019-ncov/if-you-are-sick/care-for-someone.html     [facility]: Interim Guidance for Hospital Discharge to Home: www.health.[facility].[place]/diseases/coronavirus/hcp/hospdischarge.pdf    [facility] clinical trials (COVID-19 research studies): [email or web address]     Below are the COVID-19 hotlines at the [place] Department of Health ([facility]). Interpreters are available.   o For health questions: Call 389-772-1244 or 1-454.663.6550 (7 a.m. to 7 p.m.)  o For questions about schools and childcare: Call 446-189-3071 or 1-645.540.3264 (7 a.m. to 7 p.m.)             Reason for Disposition    [1] MILD difficulty breathing (e.g., minimal/no SOB at rest, SOB with walking, pulse <100) AND [2] NEW-onset or WORSE than normal    Severe difficulty breathing (e.g., struggling for each breath, speak in single words, pulse > 120)    Additional Information    Negative: [1] Breathing stopped AND [2] hasn't returned    Negative: Choking on something    Negative: Severe difficulty breathing (e.g., struggling for each breath, speaks in single words)    Negative: Bluish (or gray) lips or face now    Negative: Difficult to awaken or acting confused (e.g., disoriented, slurred speech)    Negative: Passed out (i.e., lost consciousness, collapsed and was not responding)    Negative: Wheezing started suddenly after medicine, an allergic food or bee sting    Negative: Stridor    Negative: Slow, shallow and weak breathing    Negative: Sounds like a life-threatening emergency to the triager    Negative: Chest pain    Negative: [1] Wheezing (high pitched whistling sound) AND [2] previous asthma attacks or use of asthma medicines    Negative: [1] Difficulty breathing AND [2] only present when coughing    Negative: [1] Difficulty breathing AND [2] only from stuffy or runny nose    Negative: [1]  "Difficulty breathing AND [2] within 14 days of COVID-19 Exposure    Negative: [1] MODERATE difficulty breathing (e.g., speaks in phrases, SOB even at rest, pulse 100-120) AND [2] NEW-onset or WORSE than normal    Negative: Wheezing can be heard across the room    Negative: Drooling or spitting out saliva (because can't swallow)    Negative: History of prior \"blood clot\" in leg or lungs (i.e., deep vein thrombosis, pulmonary embolism)    Negative: History of inherited increased risk of blood clots (e.g., Factor 5 Leiden, Anti-thrombin 3, Protein C or Protein S deficiency, Prothrombin mutation)    Negative: Major surgery in the past month    Negative: Hip or leg fracture (broken bone) in past month (or had cast on leg or ankle in past month)    Negative: Illness requiring prolonged bedrest in past month (e.g., immobilization, long hospital stay)    Negative: Long-distance travel in past month (e.g., car, bus, train, plane; with trip lasting 6 or more hours)    Negative: Extra heart beats OR irregular heart beating   (i.e., \"palpitations\")    Negative: Fever > 103 F (39.4 C)    Negative: [1] Fever > 101 F (38.3 C) AND [2] age > 60    Negative: [1] Fever > 100.0 F (37.8 C) AND [2] bedridden (e.g., nursing home patient, CVA, chronic illness, recovering from surgery)    Negative: [1] Fever > 100.0 F (37.8 C) AND [2] diabetes mellitus or weak immune system (e.g., HIV positive, cancer chemo, splenectomy, organ transplant, chronic steroids)    Negative: [1] Periods where breathing stops and then resumes normally AND [2] bedridden (e.g., nursing home patient, CVA)    Negative: Pregnant or postpartum (< 1 month since delivery)    Negative: Patient sounds very sick or weak to the triager    Protocols used: BREATHING DIFFICULTY-A-AH, ASTHMA ATTACK-A-AH      "

## 2021-11-10 ENCOUNTER — NURSE TRIAGE (OUTPATIENT)
Dept: NURSING | Facility: CLINIC | Age: 36
End: 2021-11-10
Payer: MEDICARE

## 2021-11-10 NOTE — TELEPHONE ENCOUNTER
Caller contacted ne ED at [facility]  is taking  her BP is a [facility] and  the reading was  148/102; very anxious.   States hr BP was elevated at last OV but does not recall if any recommendations were made   Caller rechecks BP and  It remains at  160/108  Triage protocol reviieied ; CALLIE goldstein   Caller seen in ED  In past  Month for same symptoms   Has not followed up with PCP  Advised to rest   Advised to call her PCP in morning an discuss BP; may need to be seen   Advised to be seen urgently for any new or or woseninsgsympoms   Caller understands and will comply  Jessica Albarado RN  FNA            Reason for Disposition    Systolic BP  >= 180 OR Diastolic >= 110    Additional Information    Negative: [1] MILD difficulty breathing (e.g., minimal/no SOB at rest, SOB with walking, pulse <100) AND [2] NEW-onset or WORSE than normal    Negative: [1] Longstanding difficulty breathing (e.g., CHF, COPD, emphysema) AND [2] WORSE than normal    Negative: [1] Longstanding difficulty breathing AND [2] not responding to usual therapy    Negative: [1] Continuous (nonstop) coughing AND [2] keeps from working or sleeping    Negative: Difficult to awaken or acting confused (e.g., disoriented, slurred speech)    Negative: Severe difficulty breathing (e.g., struggling for each breath, speaks in single words)    Negative: [1] Weakness of the face, arm or leg on one side of the body AND [2] new onset    Negative: [1] Numbness (i.e., loss of sensation) of the face, arm or leg on one side of the body AND [2] new onset    Negative: [1] Chest pain lasts > 5 minutes AND [2] history of heart disease  (i.e., heart attack, bypass surgery, angina, angioplasty, CHF)    Negative: [1] Chest pain AND [2] took nitrogylcerin AND [3] pain was not relieved    Negative: Sounds like a life-threatening emergency to the triager    Negative: Symptom is main concern  (e.g., headache, chest pain)    Negative: Low blood pressure is main concern    Negative:  [1] Systolic BP  >= 160 OR Diastolic >= 100 AND [2] cardiac or neurologic symptoms (e.g., chest pain, difficulty breathing, unsteady gait, blurred vision)    Negative: [1] Pregnant > 20 weeks (or postpartum < 6 weeks) AND [2] new hand or face swelling    Negative: [1] Pregnant > 20 weeks AND [2] BP Systolic BP  >= 140 OR Diastolic >= 90    Negative: [1] Systolic BP  >= 200 OR Diastolic >= 120  AND [2] having NO cardiac or neurologic symptoms    Negative: [1] Postpartum < 6 weeks AND [2] BP Systolic BP  >= 140 OR Diastolic >= 90    Negative: [1] Systolic BP  >= 180 OR Diastolic >= 110 AND [2] missed most recent dose of blood pressure medication    Negative: Ran out of BP medications    Protocols used: HIGH BLOOD PRESSURE-A-AH, BREATHING DIFFICULTY-A-AH

## 2021-11-11 ENCOUNTER — NURSE TRIAGE (OUTPATIENT)
Dept: FAMILY MEDICINE | Facility: CLINIC | Age: 36
End: 2021-11-11
Payer: MEDICARE

## 2021-11-11 NOTE — TELEPHONE ENCOUNTER
"Haroon Cruz is a 36 year old female who calls with Chest Pain  The pain began 1 day ago.  Severity is stated as moderate.  Rating on pain scale is 7.  The pain is described as sharp, achey and tight,  with radiation to left neck/jaw and upper back.  Symptoms associated with the chest pain SOB and lightheadedness.  Significant history includes elevated cholesterol, hypertension and was a smoker.  Pain is aggravated by unknown, and relieved by none.  Patient has also tried eating, NSAIDS, rest and sitting up.    Plan Emergency Room.  Patient informed of the following home remedies none.  Patient agrees with this plan.  She will have her friend who is with her now bring her to the ER      Blanca Sparks RN      Reason for Disposition    [1] Chest pain lasts > 5 minutes AND [2] age > 50    Answer Assessment - Initial Assessment Questions  1. LOCATION: \"Where does it hurt?\"        Chest pain into neck and back  2. RADIATION: \"Does the pain go anywhere else?\" (e.g., into neck, jaw, arms, back)      Neck and back  3. ONSET: \"When did the chest pain begin?\" (Minutes, hours or days)       yesterday  4. PATTERN \"Does the pain come and go, or has it been constant since it started?\"  \"Does it get worse with exertion?\"       Comes and goes  5. DURATION: \"How long does it last\" (e.g., seconds, minutes, hours)      For minutes  6. SEVERITY: \"How bad is the pain?\"  (e.g., Scale 1-10; mild, moderate, or severe)     - MILD (1-3): doesn't interfere with normal activities      - MODERATE (4-7): interferes with normal activities or awakens from sleep     - SEVERE (8-10): excruciating pain, unable to do any normal activities        moderate  7. CARDIAC RISK FACTORS: \"Do you have any history of heart problems or risk factors for heart disease?\" (e.g., prior heart attack, angina; high blood pressure, diabetes, being overweight, high cholesterol, smoking, or strong family history of heart disease)      Yes, cholesterol and was a " "smoker  8. PULMONARY RISK FACTORS: \"Do you have any history of lung disease?\"  (e.g., blood clots in lung, asthma, emphysema, birth control pills)      no  9. CAUSE: \"What do you think is causing the chest pain?\"      unknown  10. OTHER SYMPTOMS: \"Do you have any other symptoms?\" (e.g., dizziness, nausea, vomiting, sweating, fever, difficulty breathing, cough)        Shortness of breath, lightheadedness  11. PREGNANCY: \"Is there any chance you are pregnant?\" \"When was your last menstrual period?\"        No gets the depo shot    Protocols used: CHEST PAIN-A-AH    "

## 2022-11-23 ENCOUNTER — HOSPITAL ENCOUNTER (EMERGENCY)
Facility: CLINIC | Age: 37
Discharge: HOME | End: 2022-11-23
Attending: PHYSICIAN ASSISTANT | Admitting: PHYSICIAN ASSISTANT
Payer: COMMERCIAL

## 2022-11-23 VITALS
TEMPERATURE: 97.1 F | RESPIRATION RATE: 16 BRPM | DIASTOLIC BLOOD PRESSURE: 101 MMHG | OXYGEN SATURATION: 99 % | HEART RATE: 94 BPM | SYSTOLIC BLOOD PRESSURE: 143 MMHG

## 2022-11-23 DIAGNOSIS — B37.2 CANDIDAL SKIN INFECTION: ICD-10-CM

## 2022-11-23 PROCEDURE — G0463 HOSPITAL OUTPT CLINIC VISIT: HCPCS | Performed by: PHYSICIAN ASSISTANT

## 2022-11-23 PROCEDURE — 99213 OFFICE O/P EST LOW 20 MIN: CPT | Performed by: PHYSICIAN ASSISTANT

## 2022-11-23 RX ORDER — NYSTATIN 100000 U/G
CREAM TOPICAL 2 TIMES DAILY
Qty: 30 G | Refills: 0 | Status: SHIPPED | OUTPATIENT
Start: 2022-11-23

## 2022-11-23 ASSESSMENT — ENCOUNTER SYMPTOMS: CONSTITUTIONAL NEGATIVE: 1

## 2022-11-24 NOTE — ED PROVIDER NOTES
History     Chief Complaint   Patient presents with     Rash     Rash under both arms and in both sides of groin.     HPI  Haroon Cruz is a 37 year old female who presents with complaints of rash under both armpits and to both sides of groin.  The rash is red and raised.  She states she tried applying an old nystatin cream to the area with improvement.  Denies any known new exposures such as new detergents, shampoo, or soaps.  Does not have any contacts with similar rashes.  Denies associated infectious symptoms; states she otherwise feels well.  Denies fevers, chills, cough, sore throat, sinus pressure, nasal congestion, nausea, vomiting, diarrhea, or abdominal pain.      Allergies:  Allergies   Allergen Reactions     Excedrin Extra Strength Nausea and Vomiting     Sumatriptan Nausea and Vomiting     Adhesive Tape Rash       Problem List:    Patient Active Problem List    Diagnosis Date Noted     Normal vaginal delivery 02/01/2015     Priority: Medium     Lice infestation 02/01/2015     Priority: Medium        Past Medical History:    Past Medical History:   Diagnosis Date     Lice infestation 2/1/2015     Normal vaginal delivery 2/1/2015       Past Surgical History:    No past surgical history on file.    Family History:    No family history on file.    Social History:  Marital Status:  Single [1]  Social History     Tobacco Use     Smoking status: Former     Smokeless tobacco: Current     Tobacco comments:     pt uses chewing tobacco and ecigarettes all pregnancy   Substance Use Topics     Alcohol use: No     Drug use: No        Medications:    cetirizine (ZYRTEC) 10 MG tablet  D3-50 1.25 MG (94950 UT) capsule  nystatin (MYCOSTATIN) 578085 UNIT/GM external cream  ADVAIR DISKUS 250-50 MCG/DOSE inhaler  albuterol (PROAIR HFA/PROVENTIL HFA/VENTOLIN HFA) 108 (90 Base) MCG/ACT inhaler  albuterol (PROVENTIL) (2.5 MG/3ML) 0.083% neb solution  docusate sodium (COLACE) 100 MG capsule  fluticasone (FLONASE) 50  MCG/ACT nasal spray  ibuprofen (ADVIL,MOTRIN) 400-800 mg tablet  lanolin ointment  mometasone-formoterol (DULERA) 100-5 MCG/ACT oral inhaler  nystatin (MYCOSTATIN) 554227 UNIT/GM external cream  nystatin (MYCOSTATIN) 077345 UNIT/GM external cream  nystatin (MYCOSTATIN) 900916 UNIT/GM external powder  nystatin (MYCOSTATIN) 253195 UNIT/GM external powder  polyethylene glycol-propylene glycol (SYSTANE) 0.4-0.3 % SOLN ophthalmic solution  Prenatal Vit-Fe Fumarate-FA (PRENATAL VITAMINS) 28-0.8 MG TABS          Review of Systems   Constitutional: Negative.    Skin: Positive for rash.   All other systems reviewed and are negative.      Physical Exam   BP: (!) 143/101  Pulse: 94  Temp: 97.1  F (36.2  C)  Resp: 16  SpO2: 99 %      Physical Exam  Constitutional:       General: She is not in acute distress.     Appearance: Normal appearance. She is not ill-appearing, toxic-appearing or diaphoretic.   HENT:      Head: Normocephalic and atraumatic.   Eyes:      Conjunctiva/sclera: Conjunctivae normal.   Pulmonary:      Effort: Pulmonary effort is normal.   Skin:     General: Skin is warm and dry.      Findings: Rash present.      Comments: Erythematous raised plaques to bilateral axilla and inguinal regions.  No drainage or vesicles.   Neurological:      Mental Status: She is alert.         ED Course                 Procedures    No results found for this or any previous visit (from the past 24 hour(s)).    Medications - No data to display    Assessments & Plan (with Medical Decision Making)     Pt is a 37 year old female who presents with complaints of rash under both armpits and to both sides of groin.  The rash is red and raised.  She states she tried applying an old nystatin cream to the area with improvement.  Denies any known new exposures.    Pt is afebrile on arrival.  Exam as above.  Rash appears consistent with candidal skin infection.  Return precautions were reviewed.  Hand-outs were provided.    Patient was sent  with Nystatin cream and was instructed to follow-up with PCP in 3-5 days for continued care and management or sooner if new or worsening symptoms.  She is to return to the ED for persistent and/or worsening symptoms.  Patient expressed understanding of the diagnosis and plan and was discharged home in good condition.    I have reviewed the nursing notes.    I have reviewed the findings, diagnosis, plan and need for follow up with the patient.    Discharge Medication List as of 11/23/2022  7:59 PM      START taking these medications    Details   !! nystatin (MYCOSTATIN) 342784 UNIT/GM external cream Apply topically 2 times dailyDisp-30 g, [id number]       !! - Potential duplicate medications found. Please discuss with provider.          Final diagnoses:   Candidal skin infection       11/23/2022   [facility] EMERGENCY DEPT      Disclaimer:  This note consists of symbols derived from keyboarding, dictation and/or voice recognition software.  As a result, there may be errors in the script that have gone undetected.  Please consider this when interpreting information found in this chart.     Blanca Sanchez PA-C  11/23/22 2025

## 2024-01-16 ENCOUNTER — OFFICE VISIT (OUTPATIENT)
Dept: ORTHOPEDICS | Facility: CLINIC | Age: 39
End: 2024-01-16
Payer: COMMERCIAL

## 2024-01-16 VITALS
HEIGHT: 65 IN | BODY MASS INDEX: 42.15 KG/M2 | DIASTOLIC BLOOD PRESSURE: 96 MMHG | SYSTOLIC BLOOD PRESSURE: 146 MMHG | WEIGHT: 253 LBS

## 2024-01-16 DIAGNOSIS — S83.511D RUPTURE OF ANTERIOR CRUCIATE LIGAMENT OF RIGHT KNEE, SUBSEQUENT ENCOUNTER: Primary | ICD-10-CM

## 2024-01-16 DIAGNOSIS — M25.361 KNEE INSTABILITY, RIGHT: ICD-10-CM

## 2024-01-16 DIAGNOSIS — G89.29 CHRONIC PAIN OF RIGHT KNEE: ICD-10-CM

## 2024-01-16 DIAGNOSIS — M25.561 CHRONIC PAIN OF RIGHT KNEE: ICD-10-CM

## 2024-01-16 PROCEDURE — 99203 OFFICE O/P NEW LOW 30 MIN: CPT | Performed by: FAMILY MEDICINE

## 2024-01-16 NOTE — PROGRESS NOTES
"Haroon Cruz  :  1985  DOS: 23  MRN: [id number]    Sports Medicine Clinic Visit    PCP: Aicha Hernandez    Haroon Cruz is a 35 year old female who is seen in follow up for right knee pain.    Interim History - 2024  Since last visit on 10/29/20 patient has moderate right knee pain.  Patient notes that she is only able to ambulate for short periods with knee brace for stability.   Patient would like orders for new brace.  No interim injury.       Review of Systems  Musculoskeletal: as above  Remainder of review of systems is negative including constitutional, CV, pulmonary, GI, Skin and Neurologic except as noted in HPI or medical history.    Past Medical History:   Diagnosis Date    Lice infestation 2015    Normal vaginal delivery 2015     No past surgical history on file.  No family history on file.    Objective  BP (!) 146/96   Ht 1.651 m (5' 5\")   Wt 114.8 kg (253 lb)   BMI 42.10 kg/m      General: healthy, alert and in no distress, obese    HEENT: no scleral icterus or conjunctival erythema   Skin: no suspicious lesions or rash. No jaundice.   CV: regular rhythm by palpation, 2+ distal pulses, no pedal edema    Resp: normal respiratory effort without conversational dyspnea   Psych: normal mood and affect    Gait: antalgic, appropriate coordination and balance   Neuro: normal light touch sensory exam of the extremities. Motor strength as noted below     Right Knee exam    ROM:        Full active and passive ROM with flexion and extension, though some guarding present    Inspection:       no visible ecchymosis       no visible or palpable edema or effusion    Skin:       no visible deformities       well perfused       capillary refill brisk    Patellar Motion:        Normal patellar tracking noted through range of motion       Lateral tilt noted in patella       Crepitus noted in the patellofemoral joint    Tender:        Anterolateral knee joint    Non Tender:      "    remainder of knee area    Special Tests:        positive (+) Lachman       positive (+) anterior drawer       neg (-) posterior drawer       neg (-) varus and valgus at 0 deg and 30 deg       no pain with forced extension    Evaluation of ipsilateral kinetic chain       normal strength with hip extension and abduction       Decreased right quad tone      Radiology  Results for orders placed or performed during the hospital encounter of 09/01/20   XR Knee Right 1/2 Views    Narrative    EXAM: XR KNEE RT 1 /2 VW  LOCATION: [facility]  DATE/TIME: 9/2/2020 12:22 AM    INDICATION: Injury with fall and lateral right knee pain.  COMPARISON: None.      Impression    IMPRESSION: Normal joint spaces and alignment. No fracture or joint effusion.     Results for orders placed or performed during the hospital encounter of 10/20/20   MR Knee Right w/o Contrast     Value    Radiologist flags Full-thickness tear of the midsubstance fibers of    Narrative    EXAMINATION: MRI of the right knee without contrast    DATE:  10/20/2020    HISTORY: Knee instability    TECHNIQUE: Multiplanar, multisequence MR imaging of the right knee was  obtained using standard sequences in 3 orthogonal planes without the  use of intravenous or intra-articular gadolinium contrast.    Comparison:  Comparison radiographs dated 9/2/2020 were reviewed,  which demonstrated no acute bone abnormality.    FINDINGS:    In the medial compartment, there is soft tissue edema at the junction  of the posterior horn and the posterior capsule, concerning for a  meniscocapsular injury.. There is no high-grade or full-thickness  cartilage loss or subchondral edema.    In the lateral compartment, there is horizontal signal throughout the  anterior horn, body, and posterior horn of the lateral meniscus with  the vertical peripheral tear involving the posterior horn, sagittal  series 6 image 8. There is no high-grade or full-thickness cartilage  loss or  subchondral edema.    In the patellofemoral compartment, there is no high-grade or  full-thickness cartilage loss or subchondral edema.    The posterior cruciate ligament is intact.    Extensive bone marrow edema like signal intensity visualized  throughout the knee, including along the posterior aspect of the  medial and lateral tibial plateau, with bone marrow edema along the  medial and lateral femoral condyle. There is full-thickness tear  involving the mid substance fibers of the anterior cruciate ligament  with slight laxity of the more distal ACL fibers. Anterior translation  of the tibia with respect to the femur.    Increased signal around the tibial collateral ligament with high-grade  to full-thickness tear of the superficial fibers of the mid tibial  collateral ligament. There is marked edema in the region of the  meniscal femoral ligament.    The anterior iliotibial band, fibular collateral ligament, biceps  femoris tendon, and popliteus tendons are intact..     There is a small joint effusion. No popliteal cyst. No joint bodies.    The extensor mechanism is intact and normal in appearance.       Impression    IMPRESSION:  1. Small right knee joint effusion.  2. Extensive bone marrow edema about the right knee including the  posterior aspect of the medial and lateral tibial plateau as well as  along the lateral and medial femoral condyle and throughout the  proximal tibia. There is associated full-thickness tear involving the  mid substance fibers of the anterior cruciate ligament with anterior  translation of the tibia with respect to the femur.  3. High-grade to full-thickness tear involving the superficial fibers  of the right knee tibial collateral ligament with tearing of the deep  meniscofemoral component.  4. Peripheral vertical tear involving the posterior horn of the right  knee lateral meniscus as above.  5. Soft tissue edema at the junction of the posterior horn of the  right knee medial  meniscus with the capsular structures, possibly  representing a meniscocapsular injury/ramp lesion.  6. The lateral supporting structures are intact.  7. No high-grade cartilage loss.     [Consider Follow Up: Full-thickness tear of the midsubstance fibers of  the right knee anterior cruciate ligament with additional findings as  above.]    This report will be copied to the [facility] to ensure a  provider acknowledges the finding.     BRIANNA MOSQUEDA MD       Assessment:  1. Rupture of anterior cruciate ligament of right knee, subsequent encounter    2. Chronic pain of right knee    3. Knee instability, right        Plan:  Discussed the assessment with the patient.  Follow up: prn  Detailed review of MRI results again today, including ACL tear, MCL injury, meniscocapsular injury  Persistent instability sx, likely related to ACL tear  Continue hinged brace, new brace order provided today as her brace is now worn out   Again recommended to start PT to work on ROM and reconditioning, safe HEP  Offered surgical referral for ACL tear again today, I think it would be good for her to have a consultation to review options, she declined again for now  She continues to be more interested in conservative approach, and we discussed the relative short and long term risks, and I again emphasized the need to work with PT  XR and MR images independently visualized and reviewed with patient today in clinic  Supportive care reviewed  All questions were answered today  Contact us with additional questions or concerns  Signs and sx of concern reviewed      Moshe Jade DO, LOIS  Primary Care Sports Medicine  [facility]           Disclaimer: This note consists of symbols derived from keyboarding, dictation and/or voice recognition software. As a result, there may be errors in the script that have gone undetected. Please consider this when interpreting information found in this chart.

## 2024-01-16 NOTE — LETTER
"    2024         RE: Haroon Cruz  [address]  [facility] [address]        Dear Colleague,    Thank you for referring your patient, Haroon Cruz, to the [facility] [place]. Please see a copy of my visit note below.    Haroon Cruz  :  1985  DOS: 24  MRN: [id number]    Sports Medicine Clinic Visit    PCP: Aicha Hernandez    Haroon Cruz is a 35 year old female who is seen in follow up for right knee pain.    Interim History - 2024  Since last visit on 10/29/20 patient has moderate right knee pain.  Patient notes that she is only able to ambulate for short periods with knee brace for stability.   Patient would like orders for new brace.  No interim injury.       Review of Systems  Musculoskeletal: as above  Remainder of review of systems is negative including constitutional, CV, pulmonary, GI, Skin and Neurologic except as noted in HPI or medical history.    Past Medical History:   Diagnosis Date     Lice infestation 2015     Normal vaginal delivery 2015     No past surgical history on file.  No family history on file.    Objective  BP (!) 146/96   Ht 1.651 m (5' 5\")   Wt 114.8 kg (253 lb)   BMI 42.10 kg/m      General: healthy, alert and in no distress, obese    HEENT: no scleral icterus or conjunctival erythema   Skin: no suspicious lesions or rash. No jaundice.   CV: regular rhythm by palpation, 2+ distal pulses, no pedal edema    Resp: normal respiratory effort without conversational dyspnea   Psych: normal mood and affect    Gait: antalgic, appropriate coordination and balance   Neuro: normal light touch sensory exam of the extremities. Motor strength as noted below     Right Knee exam    ROM:        Full active and passive ROM with flexion and extension, though some guarding present    Inspection:       no visible ecchymosis       no visible or palpable edema or effusion    Skin:       no visible deformities     "   well perfused       capillary refill brisk    Patellar Motion:        Normal patellar tracking noted through range of motion       Lateral tilt noted in patella       Crepitus noted in the patellofemoral joint    Tender:        Anterolateral knee joint    Non Tender:         remainder of knee area    Special Tests:        positive (+) Lachman       positive (+) anterior drawer       neg (-) posterior drawer       neg (-) varus and valgus at 0 deg and 30 deg       no pain with forced extension    Evaluation of ipsilateral kinetic chain       normal strength with hip extension and abduction       Decreased right quad tone      Radiology  Results for orders placed or performed during the hospital encounter of 09/01/20   XR Knee Right 1/2 Views    Narrative    EXAM: XR KNEE RT 1 /2 VW  LOCATION: [facility]  DATE/TIME: 9/2/2020 12:22 AM    INDICATION: Injury with fall and lateral right knee pain.  COMPARISON: None.      Impression    IMPRESSION: Normal joint spaces and alignment. No fracture or joint effusion.     Results for orders placed or performed during the hospital encounter of 10/20/20   MR Knee Right w/o Contrast     Value    Radiologist flags Full-thickness tear of the midsubstance fibers of    Narrative    EXAMINATION: MRI of the right knee without contrast    DATE:  10/20/2020    HISTORY: Knee instability    TECHNIQUE: Multiplanar, multisequence MR imaging of the right knee was  obtained using standard sequences in 3 orthogonal planes without the  use of intravenous or intra-articular gadolinium contrast.    Comparison:  Comparison radiographs dated 9/2/2020 were reviewed,  which demonstrated no acute bone abnormality.    FINDINGS:    In the medial compartment, there is soft tissue edema at the junction  of the posterior horn and the posterior capsule, concerning for a  meniscocapsular injury.. There is no high-grade or full-thickness  cartilage loss or subchondral edema.    In the lateral  compartment, there is horizontal signal throughout the  anterior horn, body, and posterior horn of the lateral meniscus with  the vertical peripheral tear involving the posterior horn, sagittal  series 6 image 8. There is no high-grade or full-thickness cartilage  loss or subchondral edema.    In the patellofemoral compartment, there is no high-grade or  full-thickness cartilage loss or subchondral edema.    The posterior cruciate ligament is intact.    Extensive bone marrow edema like signal intensity visualized  throughout the knee, including along the posterior aspect of the  medial and lateral tibial plateau, with bone marrow edema along the  medial and lateral femoral condyle. There is full-thickness tear  involving the mid substance fibers of the anterior cruciate ligament  with slight laxity of the more distal ACL fibers. Anterior translation  of the tibia with respect to the femur.    Increased signal around the tibial collateral ligament with high-grade  to full-thickness tear of the superficial fibers of the mid tibial  collateral ligament. There is marked edema in the region of the  meniscal femoral ligament.    The anterior iliotibial band, fibular collateral ligament, biceps  femoris tendon, and popliteus tendons are intact..     There is a small joint effusion. No popliteal cyst. No joint bodies.    The extensor mechanism is intact and normal in appearance.       Impression    IMPRESSION:  1. Small right knee joint effusion.  2. Extensive bone marrow edema about the right knee including the  posterior aspect of the medial and lateral tibial plateau as well as  along the lateral and medial femoral condyle and throughout the  proximal tibia. There is associated full-thickness tear involving the  mid substance fibers of the anterior cruciate ligament with anterior  translation of the tibia with respect to the femur.  3. High-grade to full-thickness tear involving the superficial fibers  of the right knee  tibial collateral ligament with tearing of the deep  meniscofemoral component.  4. Peripheral vertical tear involving the posterior horn of the right  knee lateral meniscus as above.  5. Soft tissue edema at the junction of the posterior horn of the  right knee medial meniscus with the capsular structures, possibly  representing a meniscocapsular injury/ramp lesion.  6. The lateral supporting structures are intact.  7. No high-grade cartilage loss.     [Consider Follow Up: Full-thickness tear of the midsubstance fibers of  the right knee anterior cruciate ligament with additional findings as  above.]    This report will be copied to the [facility] to ensure a  provider acknowledges the finding.     BRIANNA MOSQUEDA MD       Assessment:  1. Rupture of anterior cruciate ligament of right knee, subsequent encounter    2. Chronic pain of right knee    3. Knee instability, right        Plan:  Discussed the assessment with the patient.  Follow up: prn  Detailed review of MRI results again today, including ACL tear, MCL injury, meniscocapsular injury  Persistent instability sx, likely related to ACL tear  Continue hinged brace, new brace order provided today as her brace is now worn out   Again recommended to start PT to work on ROM and reconditioning, safe HEP  Offered surgical referral for ACL tear again today, I think it would be good for her to have a consultation to review options, she declined again for now  She continues to be more interested in conservative approach, and we discussed the relative short and long term risks, and I again emphasized the need to work with PT  XR and MR images independently visualized and reviewed with patient today in clinic  Supportive care reviewed  All questions were answered today  Contact us with additional questions or concerns  Signs and sx of concern reviewed      Moshe Jade DO, LOIS  Primary Care Sports Medicine  [facility]            Disclaimer: This note consists of symbols derived from keyboarding, dictation and/or voice recognition software. As a result, there may be errors in the script that have gone undetected. Please consider this when interpreting information found in this chart.      Again, thank you for allowing me to participate in the care of your patient.        Sincerely,        Moshe Jade, DO

## 2024-02-23 NOTE — PROGRESS NOTES
Detail Level: Zone [facility]          OUTPATIENT PHYSICAL THERAPY ORTHOPEDIC EVALUATION  PLAN OF TREATMENT FOR OUTPATIENT REHABILITATION  (COMPLETE FOR INITIAL CLAIMS ONLY)  Patient's Last Name, First Name, M.I.  YOB: 1985  CruzHaroon       Provider s Name:  [facility]   Medical Record No.  [id number]   Start of Care Date:  10/22/20   Onset Date:  09/01/20   Type:     _X__PT   ___OT   ___SLP Medical Diagnosis:  acute pain of right knee     PT Diagnosis:  decreased right LE strength following ACL tear   Visits from SOC:  1      _________________________________________________________________________________  Plan of Treatment/Functional Goals:  balance training, bed mobility training, gait training, joint mobilization, manual therapy, neuromuscular re-education, ROM, strengthening, stretching, transfer training  [id number]  Cryotherapy, Electrical stimulation     Goals  Goal Identifier: 1  Goal Description: Patient will be able to take 3 steps without knee brace on and feel confident knee won't buckle in order to transfer short distances.   Target Date: 11/19/20    Goal Identifier: 2  Goal Description: Patient will be able to complete 30 SLR in a row without fatigue in order to improve quad control for walking.   Target Date: 11/19/20    Goal Identifier: 3  Goal Description: Patient will be able to ascend/descend  steps with 1 railing with reciprocal gait pattern without any feelings of knee buckling.   Target Date: 01/14/21    Goal Identifier: 4  Goal Description: Patient will report no buckling of the right knee during daily chores and activites.   Target Date: 01/14/21             Therapy Frequency:  1 time/week  Predicted Duration of Therapy Intervention:  12 weeks    Bindu Reynaga, PT                 I CERTIFY THE NEED FOR THESE SERVICES FURNISHED UNDER        THIS PLAN OF TREATMENT AND WHILE UNDER MY CARE     (Physician co-signature of this document indicates  review and certification of the therapy plan).                       Certification Date From:  10/22/20   Certification Date To:  01/14/21    Referring Provider:  Moshe Jade    Initial Assessment        See Epic Evaluation Start of Care Date: 10/22/20                                                                               Detail Level: Generalized Detail Level: Simple

## 2024-08-15 ENCOUNTER — HOSPITAL ENCOUNTER (EMERGENCY)
Facility: CLINIC | Age: 39
Discharge: HOME | End: 2024-08-16
Attending: STUDENT IN AN ORGANIZED HEALTH CARE EDUCATION/TRAINING PROGRAM | Admitting: STUDENT IN AN ORGANIZED HEALTH CARE EDUCATION/TRAINING PROGRAM
Payer: COMMERCIAL

## 2024-08-15 VITALS
TEMPERATURE: 100.8 F | BODY MASS INDEX: 42.32 KG/M2 | HEIGHT: 65 IN | OXYGEN SATURATION: 98 % | RESPIRATION RATE: 24 BRPM | SYSTOLIC BLOOD PRESSURE: 149 MMHG | HEART RATE: 121 BPM | WEIGHT: 254 LBS | DIASTOLIC BLOOD PRESSURE: 122 MMHG

## 2024-08-15 DIAGNOSIS — U07.1 COVID-19 VIRUS INFECTION: ICD-10-CM

## 2024-08-15 PROCEDURE — 87637 SARSCOV2&INF A&B&RSV AMP PRB: CPT | Performed by: STUDENT IN AN ORGANIZED HEALTH CARE EDUCATION/TRAINING PROGRAM

## 2024-08-15 PROCEDURE — 99284 EMERGENCY DEPT VISIT MOD MDM: CPT | Mod: 25 | Performed by: STUDENT IN AN ORGANIZED HEALTH CARE EDUCATION/TRAINING PROGRAM

## 2024-08-15 PROCEDURE — 96374 THER/PROPH/DIAG INJ IV PUSH: CPT | Performed by: STUDENT IN AN ORGANIZED HEALTH CARE EDUCATION/TRAINING PROGRAM

## 2024-08-15 PROCEDURE — 99284 EMERGENCY DEPT VISIT MOD MDM: CPT | Performed by: STUDENT IN AN ORGANIZED HEALTH CARE EDUCATION/TRAINING PROGRAM

## 2024-08-15 PROCEDURE — 96361 HYDRATE IV INFUSION ADD-ON: CPT | Performed by: STUDENT IN AN ORGANIZED HEALTH CARE EDUCATION/TRAINING PROGRAM

## 2024-08-15 RX ORDER — ACETAMINOPHEN 325 MG/1
975 TABLET ORAL ONCE
Status: COMPLETED | OUTPATIENT
Start: 2024-08-16 | End: 2024-08-16

## 2024-08-15 ASSESSMENT — COLUMBIA-SUICIDE SEVERITY RATING SCALE - C-SSRS
2. HAVE YOU ACTUALLY HAD ANY THOUGHTS OF KILLING YOURSELF IN THE PAST MONTH?: NO
6. HAVE YOU EVER DONE ANYTHING, STARTED TO DO ANYTHING, OR PREPARED TO DO ANYTHING TO END YOUR LIFE?: NO
1. IN THE PAST MONTH, HAVE YOU WISHED YOU WERE DEAD OR WISHED YOU COULD GO TO SLEEP AND NOT WAKE UP?: YES

## 2024-08-16 ENCOUNTER — APPOINTMENT (OUTPATIENT)
Dept: GENERAL RADIOLOGY | Facility: CLINIC | Age: 39
End: 2024-08-16
Attending: STUDENT IN AN ORGANIZED HEALTH CARE EDUCATION/TRAINING PROGRAM
Payer: COMMERCIAL

## 2024-08-16 LAB
ALBUMIN SERPL BCG-MCNC: 4 G/DL (ref 3.5–5.2)
ALBUMIN UR-MCNC: NEGATIVE MG/DL
ALP SERPL-CCNC: 70 U/L (ref 40–150)
ALT SERPL W P-5'-P-CCNC: 44 U/L (ref 0–50)
ANION GAP SERPL CALCULATED.3IONS-SCNC: 12 MMOL/L (ref 7–15)
APPEARANCE UR: CLEAR
AST SERPL W P-5'-P-CCNC: 43 U/L (ref 0–45)
BASE EXCESS BLDV CALC-SCNC: -1.2 MMOL/L (ref -3–3)
BASOPHILS # BLD AUTO: 0.1 10E3/UL (ref 0–0.2)
BASOPHILS NFR BLD AUTO: 0 %
BILIRUB SERPL-MCNC: 0.3 MG/DL
BILIRUB UR QL STRIP: NEGATIVE
BUN SERPL-MCNC: 4.2 MG/DL (ref 6–20)
CALCIUM SERPL-MCNC: 9 MG/DL (ref 8.8–10.4)
CHLORIDE SERPL-SCNC: 103 MMOL/L (ref 98–107)
COLOR UR AUTO: YELLOW
CREAT SERPL-MCNC: 0.69 MG/DL (ref 0.51–0.95)
EGFRCR SERPLBLD CKD-EPI 2021: >90 ML/MIN/1.73M2
EOSINOPHIL # BLD AUTO: 0.1 10E3/UL (ref 0–0.7)
EOSINOPHIL NFR BLD AUTO: 0 %
ERYTHROCYTE [DISTWIDTH] IN BLOOD BY AUTOMATED COUNT: 12 % (ref 10–15)
FLUAV RNA SPEC QL NAA+PROBE: NEGATIVE
FLUBV RNA RESP QL NAA+PROBE: NEGATIVE
GLUCOSE SERPL-MCNC: 103 MG/DL (ref 70–99)
GLUCOSE UR STRIP-MCNC: NEGATIVE MG/DL
GROUP A STREP BY PCR: NOT DETECTED
HCO3 BLDV-SCNC: 22 MMOL/L (ref 21–28)
HCO3 SERPL-SCNC: 19 MMOL/L (ref 22–29)
HCT VFR BLD AUTO: 41.7 % (ref 35–47)
HGB BLD-MCNC: 14.5 G/DL (ref 11.7–15.7)
HGB UR QL STRIP: ABNORMAL
IMM GRANULOCYTES # BLD: 0.1 10E3/UL
IMM GRANULOCYTES NFR BLD: 1 %
KETONES UR STRIP-MCNC: 5 MG/DL
LACTATE SERPL-SCNC: 1.5 MMOL/L (ref 0.7–2)
LACTATE SERPL-SCNC: 2.1 MMOL/L (ref 0.7–2)
LEUKOCYTE ESTERASE UR QL STRIP: NEGATIVE
LYMPHOCYTES # BLD AUTO: 0.5 10E3/UL (ref 0.8–5.3)
LYMPHOCYTES NFR BLD AUTO: 4 %
MCH RBC QN AUTO: 30.3 PG (ref 26.5–33)
MCHC RBC AUTO-ENTMCNC: 34.8 G/DL (ref 31.5–36.5)
MCV RBC AUTO: 87 FL (ref 78–100)
MONOCYTES # BLD AUTO: 0.9 10E3/UL (ref 0–1.3)
MONOCYTES NFR BLD AUTO: 7 %
MUCOUS THREADS #/AREA URNS LPF: PRESENT /LPF
NEUTROPHILS # BLD AUTO: 11.6 10E3/UL (ref 1.6–8.3)
NEUTROPHILS NFR BLD AUTO: 87 %
NITRATE UR QL: NEGATIVE
NRBC # BLD AUTO: 0 10E3/UL
NRBC BLD AUTO-RTO: 0 /100
O2/TOTAL GAS SETTING VFR VENT: 21 %
OXYHGB MFR BLDV: 75 % (ref 70–75)
PCO2 BLDV: 33 MM HG (ref 40–50)
PH BLDV: 7.44 [PH] (ref 7.32–7.43)
PH UR STRIP: 6 [PH] (ref 5–7)
PLATELET # BLD AUTO: 394 10E3/UL (ref 150–450)
PO2 BLDV: 38 MM HG (ref 25–47)
POTASSIUM SERPL-SCNC: 3.8 MMOL/L (ref 3.4–5.3)
PROT SERPL-MCNC: 7.4 G/DL (ref 6.4–8.3)
RBC # BLD AUTO: 4.78 10E6/UL (ref 3.8–5.2)
RBC URINE: 6 /HPF
RSV RNA SPEC NAA+PROBE: NEGATIVE
SAO2 % BLDV: 76.3 % (ref 70–75)
SARS-COV-2 RNA RESP QL NAA+PROBE: POSITIVE
SODIUM SERPL-SCNC: 134 MMOL/L (ref 135–145)
SP GR UR STRIP: 1.01 (ref 1–1.03)
SQUAMOUS EPITHELIAL: 1 /HPF
UROBILINOGEN UR STRIP-MCNC: NORMAL MG/DL
WBC # BLD AUTO: 13.2 10E3/UL (ref 4–11)
WBC URINE: 1 /HPF

## 2024-08-16 PROCEDURE — 85025 COMPLETE CBC W/AUTO DIFF WBC: CPT | Performed by: STUDENT IN AN ORGANIZED HEALTH CARE EDUCATION/TRAINING PROGRAM

## 2024-08-16 PROCEDURE — 258N000003 HC RX IP 258 OP 636: Performed by: STUDENT IN AN ORGANIZED HEALTH CARE EDUCATION/TRAINING PROGRAM

## 2024-08-16 PROCEDURE — 80053 COMPREHEN METABOLIC PANEL: CPT | Performed by: STUDENT IN AN ORGANIZED HEALTH CARE EDUCATION/TRAINING PROGRAM

## 2024-08-16 PROCEDURE — 82805 BLOOD GASES W/O2 SATURATION: CPT | Performed by: STUDENT IN AN ORGANIZED HEALTH CARE EDUCATION/TRAINING PROGRAM

## 2024-08-16 PROCEDURE — 36415 COLL VENOUS BLD VENIPUNCTURE: CPT | Performed by: STUDENT IN AN ORGANIZED HEALTH CARE EDUCATION/TRAINING PROGRAM

## 2024-08-16 PROCEDURE — 87651 STREP A DNA AMP PROBE: CPT | Performed by: STUDENT IN AN ORGANIZED HEALTH CARE EDUCATION/TRAINING PROGRAM

## 2024-08-16 PROCEDURE — 250N000011 HC RX IP 250 OP 636: Performed by: STUDENT IN AN ORGANIZED HEALTH CARE EDUCATION/TRAINING PROGRAM

## 2024-08-16 PROCEDURE — 87040 BLOOD CULTURE FOR BACTERIA: CPT | Performed by: STUDENT IN AN ORGANIZED HEALTH CARE EDUCATION/TRAINING PROGRAM

## 2024-08-16 PROCEDURE — 83605 ASSAY OF LACTIC ACID: CPT | Performed by: STUDENT IN AN ORGANIZED HEALTH CARE EDUCATION/TRAINING PROGRAM

## 2024-08-16 PROCEDURE — 71046 X-RAY EXAM CHEST 2 VIEWS: CPT

## 2024-08-16 PROCEDURE — 250N000013 HC RX MED GY IP 250 OP 250 PS 637: Performed by: STUDENT IN AN ORGANIZED HEALTH CARE EDUCATION/TRAINING PROGRAM

## 2024-08-16 PROCEDURE — 81001 URINALYSIS AUTO W/SCOPE: CPT | Performed by: STUDENT IN AN ORGANIZED HEALTH CARE EDUCATION/TRAINING PROGRAM

## 2024-08-16 RX ORDER — KETOROLAC TROMETHAMINE 15 MG/ML
15 INJECTION, SOLUTION INTRAMUSCULAR; INTRAVENOUS ONCE
Status: COMPLETED | OUTPATIENT
Start: 2024-08-16 | End: 2024-08-16

## 2024-08-16 RX ADMIN — ACETAMINOPHEN 975 MG: 325 TABLET, FILM COATED ORAL at 00:13

## 2024-08-16 RX ADMIN — SODIUM CHLORIDE 1000 ML: 9 INJECTION, SOLUTION INTRAVENOUS at 00:14

## 2024-08-16 RX ADMIN — KETOROLAC TROMETHAMINE 15 MG: 15 INJECTION, SOLUTION INTRAMUSCULAR; INTRAVENOUS at 02:17

## 2024-08-16 ASSESSMENT — ACTIVITIES OF DAILY LIVING (ADL)
ADLS_ACUITY_SCORE: 35

## 2024-08-16 NOTE — DISCHARGE INSTRUCTIONS
You have been diagnosed with COVID-19.  There is no specific treatment for this.  Make sure you are drinking plenty of fluids and getting lots of rest.  Use Tylenol and ibuprofen as needed for fever or bodyaches.  You can use decongestions as well.  You should isolate for total of 5 days from the start of symptoms.  If not having fevers and symptom-free at that time you can end your isolation.  Read the attached information for additional instructions.  If you develop trouble breathing or any other new or concerning symptoms return to the ER.

## 2024-08-16 NOTE — ED PROVIDER NOTES
"  History     Chief Complaint   Patient presents with    Headache    Fever    Fatigue     HPI  Haroon Cruz is a 39 year old female who has ADHD, anxiety, asthma, depression, PTSD, who presents to the emergency department for evaluation of a fever.  Patient states that she is \"felt like shit all day.\"  She has had chills, body aches and coughing up green sputum.  She also feels congested.  She denies trouble breathing.  No chest pain.  She has a mild sore throat.  She has not had much of an appetite and has not been eating or drinking much all day.  She has not taken any medications for her symptoms.  She denies abdominal pain.  No nausea or vomiting.  No diarrhea.  She has not had a bowel movement today.  She denies dysuria, urgency or frequency.  No rashes.  No hot swollen joints.  No known ill contacts.  No recent travel.    Allergies:  Allergies   Allergen Reactions    Aspirin-Acetaminophen-Caffeine Nausea and Vomiting    Peanut (Diagnostic) Hives    Sumatriptan Nausea and Vomiting    Adhesive Tape Rash       Problem List:    Patient Active Problem List    Diagnosis Date Noted    Normal vaginal delivery 02/01/2015     Priority: Medium    Lice infestation 02/01/2015     Priority: Medium        Past Medical History:    Past Medical History:   Diagnosis Date    Lice infestation 2/1/2015    Normal vaginal delivery 2/1/2015       Past Surgical History:    No past surgical history on file.    Family History:    No family history on file.    Social History:  Marital Status:  Single [1]  Social History     Tobacco Use    Smoking status: Former    Smokeless tobacco: Current    Tobacco comments:     pt uses chewing tobacco and ecigarettes all pregnancy   Substance Use Topics    Alcohol use: No    Drug use: No        Medications:    ADVAIR DISKUS 250-50 MCG/DOSE inhaler  albuterol (PROAIR HFA/PROVENTIL HFA/VENTOLIN HFA) 108 (90 Base) MCG/ACT inhaler  albuterol (PROVENTIL) (2.5 MG/3ML) 0.083% neb solution  cetirizine " "(ZYRTEC) 10 MG tablet  D3-50 1.25 MG (95606 UT) capsule  docusate sodium (COLACE) 100 MG capsule  fluticasone (FLONASE) 50 MCG/ACT nasal spray  ibuprofen (ADVIL,MOTRIN) 400-800 mg tablet  lanolin ointment  mometasone-formoterol (DULERA) 100-5 MCG/ACT oral inhaler  nystatin (MYCOSTATIN) 425759 UNIT/GM external cream  nystatin (MYCOSTATIN) 595251 UNIT/GM external cream  nystatin (MYCOSTATIN) 338048 UNIT/GM external cream  nystatin (MYCOSTATIN) 763418 UNIT/GM external powder  nystatin (MYCOSTATIN) 043180 UNIT/GM external powder  polyethylene glycol-propylene glycol (SYSTANE) 0.4-0.3 % SOLN ophthalmic solution  Prenatal Vit-Fe Fumarate-FA (PRENATAL VITAMINS) 28-0.8 MG TABS          Review of Systems  See HPI  Physical Exam   BP: (!) 149/122  Pulse: (!) 121  Temp: (!) 100.8  F (38.2  C)  Resp: 24  Height: 165.1 cm (5' 5\")  Weight: 115.2 kg (254 lb)  SpO2: 98 %      Physical Exam  BP (!) 149/122   Pulse (!) 121   Temp (!) 100.8  F (38.2  C) (Oral)   Resp 24   Ht 1.651 m (5' 5\")   Wt 115.2 kg (254 lb)   SpO2 98%   BMI 42.27 kg/m    General: alert, interactive, in no apparent distress  Head: atraumatic  Nose: no rhinorrhea or epistaxis  Ears: no external auditory canal discharge or bleeding.  Middle ear effusion bilaterally.  TMs are not erythematous.  There is no drainage bilaterally.  Eyes: Sclera nonicteric. Conjunctiva noninjected. PERRL, EOMI  Mouth: no tonsillar erythema, edema, or exudate.  Moist mucous membranes.  Posterior oropharynx is erythematous.  There is no exudate.  Uvula is midline.  Neck: supple, moving spontaneously no midline cervical tenderness  Lungs: No increased work of breathing.  Clear to auscultation bilaterally.  CV: Tachycardic, peripheral pulses palpable and symmetric  Abdomen: soft, nt, nd, no guarding or rebound.   Extremities: Warm and well-perfused.    Skin: no rash or diaphoresis  Neuro: CN II-XII grossly intact, strength 5/5 in UE and LEs bilaterally, sensation intact to light " touch in UE and LEs bilaterally;     ED Course        Procedures             Critical Care time:  none         Results for orders placed or performed during the hospital encounter of 08/15/24 (from the past 24 hour(s))   [place] Draw *Canceled*    Narrative    The following orders were created for panel order [place] Draw.  Procedure                               Abnormality         Status                     ---------                               -----------         ------                       Please view results for these tests on the individual orders.   Symptomatic Influenza A/B, RSV, & SARS-CoV2 PCR (COVID-19) Nasopharyngeal    Specimen: Nasopharyngeal; Swab   Result Value Ref Range    Influenza A PCR Negative Negative    Influenza B PCR Negative Negative    RSV PCR Negative Negative    SARS CoV2 PCR Positive (A) Negative    Narrative    Testing was performed using the Xpert Xpress CoV2/Flu/RSV Assay on the [employer] Instrument. This test should be ordered for the detection of SARS-CoV-2, influenza, and RSV viruses in individuals who meet clinical and/or epidemiological criteria. Test performance is unknown in asymptomatic patients. This test is for in vitro diagnostic use under the FDA EUA for laboratories certified under CLIA to perform high or moderate complexity testing. This test has not been FDA cleared or approved. A negative result does not rule out the presence of PCR inhibitors in the specimen or target RNA in concentration below the limit of detection for the assay. If only one viral target is positive but coinfection with multiple targets is suspected, the sample should be re-tested with another FDA cleared, approved, or authorized test, if coinfection would change clinical management. This test was validated by the [facility]. These laboratories are certified under the Clinical Laboratory Improvement Amendments of 1988 (CLIA-88) as qualified to perform high complexity  laboratory testing.   CBC with platelets differential    Narrative    The following orders were created for panel order CBC with platelets differential.  Procedure                               Abnormality         Status                     ---------                               -----------         ------                     CBC with platelets and d...[[id number]]  Abnormal            Final result                 Please view results for these tests on the individual orders.   Lactic Acid Whole Blood with 1X Repeat in 2 HR when >2   Result Value Ref Range    Lactic Acid, Initial 2.1 (H) 0.7 - 2.0 mmol/L   Blood gas venous   Result Value Ref Range    pH Venous 7.44 (H) 7.32 - 7.43    pCO2 Venous 33 (L) 40 - 50 mm Hg    pO2 Venous 38 25 - 47 mm Hg    Bicarbonate Venous 22 21 - 28 mmol/L    Base Excess/Deficit Venous -1.2 -3.0 - 3.0 mmol/L    FIO2 21     Oxyhemoglobin Venous 75 70 - 75 %    O2 Sat, Venous 76.3 (H) 70.0 - 75.0 %    Narrative    In healthy individuals, oxyhemoglobin (O2Hb) and oxygen saturation (SO2) are approximately equal. In the presence of dyshemoglobins, oxyhemoglobin can be considerably lower than oxygen saturation.   CBC with platelets and differential   Result Value Ref Range    WBC Count 13.2 (H) 4.0 - 11.0 10e3/uL    RBC Count 4.78 3.80 - 5.20 10e6/uL    Hemoglobin 14.5 11.7 - 15.7 g/dL    Hematocrit 41.7 35.0 - 47.0 %    MCV 87 78 - 100 fL    MCH 30.3 26.5 - 33.0 pg    MCHC 34.8 31.5 - 36.5 g/dL    RDW 12.0 10.0 - 15.0 %    Platelet Count 394 150 - 450 10e3/uL    % Neutrophils 87 %    % Lymphocytes 4 %    % Monocytes 7 %    % Eosinophils 0 %    % Basophils 0 %    % Immature Granulocytes 1 %    NRBCs per 100 WBC 0 <1 /100    Absolute Neutrophils 11.6 (H) 1.6 - 8.3 10e3/uL    Absolute Lymphocytes 0.5 (L) 0.8 - 5.3 10e3/uL    Absolute Monocytes 0.9 0.0 - 1.3 10e3/uL    Absolute Eosinophils 0.1 0.0 - 0.7 10e3/uL    Absolute Basophils 0.1 0.0 - 0.2 10e3/uL    Absolute Immature Granulocytes 0.1 <=0.4  10e3/uL    Absolute NRBCs 0.0 10e3/uL   Group A Streptococcus PCR Throat Swab    Specimen: Throat; Swab   Result Value Ref Range    Group A strep by PCR Not Detected Not Detected    Narrative    The Xpert Xpress Strep A test, performed on the [employer], is a rapid, qualitative in vitro diagnostic test for the detection of Streptococcus pyogenes (Group A ß-hemolytic Streptococcus, Strep A) in throat swab specimens from patients with signs and symptoms of pharyngitis. The Xpert Xpress Strep A test can be used as an aid in the diagnosis of Group A Streptococcal pharyngitis. The assay is not intended to monitor treatment for Group A Streptococcus infections. The Xpert Xpress Strep A test utilizes an automated real-time polymerase chain reaction (PCR) to detect Streptococcus pyogenes DNA.   UA with Microscopic   Result Value Ref Range    Color Urine Yellow Colorless, Straw, Light Yellow, Yellow    Appearance Urine Clear Clear    Glucose Urine Negative Negative mg/dL    Bilirubin Urine Negative Negative    Ketones Urine 5 (A) Negative mg/dL    Specific Gravity Urine 1.014 1.003 - 1.035    Blood Urine Small (A) Negative    pH Urine 6.0 5.0 - 7.0    Protein Albumin Urine Negative Negative mg/dL    Urobilinogen Urine Normal Normal, 2.0 mg/dL    Nitrite Urine Negative Negative    Leukocyte Esterase Urine Negative Negative    Mucus Urine Present (A) None Seen /LPF    RBC Urine 6 (H) <=2 /HPF    WBC Urine 1 <=5 /HPF    Squamous Epithelials Urine 1 <=1 /HPF   XR Chest 2 Views    Narrative    EXAM: XR CHEST 2 VIEWS  LOCATION: [facility]  DATE: 8/16/2024    INDICATION: Fever, cough  COMPARISON: None.      Impression    IMPRESSION: Negative chest.   Comprehensive metabolic panel   Result Value Ref Range    Sodium 134 (L) 135 - 145 mmol/L    Potassium 3.8 3.4 - 5.3 mmol/L    Carbon Dioxide (CO2) 19 (L) 22 - 29 mmol/L    Anion Gap 12 7 - 15 mmol/L    Urea Nitrogen 4.2 (L) 6.0 - 20.0  mg/dL    Creatinine 0.69 0.51 - 0.95 mg/dL    GFR Estimate >90 >60 mL/min/1.73m2    Calcium 9.0 8.8 - 10.4 mg/dL    Chloride 103 98 - 107 mmol/L    Glucose 103 (H) 70 - 99 mg/dL    Alkaline Phosphatase 70 40 - 150 U/L    AST 43 0 - 45 U/L    ALT 44 0 - 50 U/L    Protein Total 7.4 6.4 - 8.3 g/dL    Albumin 4.0 3.5 - 5.2 g/dL    Bilirubin Total 0.3 <=1.2 mg/dL   Lactic acid whole blood   Result Value Ref Range    Lactic Acid 1.5 0.7 - 2.0 mmol/L       Medications   sodium chloride (PF) 0.9% PF flush 3 mL (has no administration in time range)   sodium chloride (PF) 0.9% PF flush 3 mL (3 mLs Intracatheter Not Given 8/16/24 0221)   sodium chloride 0.9% BOLUS 1,000 mL (0 mLs Intravenous Stopped 8/16/24 0218)   acetaminophen (TYLENOL) tablet 975 mg (975 mg Oral $Given 8/16/24 0013)   ketorolac (TORADOL) injection 15 mg (15 mg Intravenous $Given 8/16/24 0217)       Assessments & Plan (with Medical Decision Making)     I have reviewed the nursing notes.    I have reviewed the findings, diagnosis, plan and need for follow up with the patient.          Medical Decision Making  Haroon Cruz is a 39 year old female who has ADHD, anxiety, asthma, depression, PTSD, who presents to the emergency department for evaluation of a fever.  Vital signs reviewed and notable for fever and tachycardia.  She is hypertensive.  Patient is nontoxic on exam.  Sepsis order set was initiated.  A liter of normal saline given.  She does not need a full 30 cc/kg boluses lactate is less than 4 and there is no hypotension.  Lactate was slightly elevated at 2.1.  She is given Tylenol and a single set of blood cultures were obtained due to the national shortage.  Patient tested positive for COVID-19.  She is not hypoxic or having any trouble breathing.  She is not vaccinated for COVID.  She is negative for strep, RSV and flu.  She has a leukocytosis of 13.1.  She has no UTI.  CMP reassuring.  Repeat lactate is normal.  Heart rate has improved.  She  was still having some bodyaches, so she was given Toradol.  She was feeling better after this.  She is appropriate for trial of outpatient management.  We discussed the natural course of COVID-19 and anticipatory guidance provided.  She is instructed to isolate for total of 5 days from the start of symptoms.  We discussed pushing fluids and Tylenol and ibuprofen for fever and bodyaches.  Return precautions discussed.        New Prescriptions    No medications on file       Final diagnoses:   COVID-19 virus infection       8/15/2024   [facility] EMERGENCY DEPT       Tico Moody MD  08/16/24 2550

## 2024-08-17 ENCOUNTER — NURSE TRIAGE (OUTPATIENT)
Dept: NURSING | Facility: CLINIC | Age: 39
End: 2024-08-17
Payer: COMMERCIAL

## 2024-08-17 NOTE — TELEPHONE ENCOUNTER
"Pt seen at ED last night due to COVID and was told that ear \"was inflamed\"     Pt has on and off right ear pain, not present at the time of call. \"Every once in a while gives me shooting pains.\"  \"Hurts when I eat something hot.\"  Ear feels warm. Feels lightheaded when she stands, able to walk from bed to bathroom.    Early last night, ear inflamed , been sore    On and off headache.  Took Advil at midnight.    No fever  Has cough. Pt states she has asthma since 12 y/o.    PLAN:  Home care. If ear pain becomes steady, call PCP.  Pt has questions about use of neb solution, FNA advised to contact her primary (Norman) for instructions on how to use neb. There is no need to go to the ED now for transient ear pain. Use of Advil can help for 6-8 hrs.    Pt verbalized understanding.    Ginny Romero RN/[place] Nurse Advisor          Reason for Disposition   Mild earache and ear congestion (fullness) occurring during air travel    Additional Information   Negative: Moving the earlobe or touching the ear clearly increases the pain   Negative: Foreign body stuck in the ear (e.g., bug, piece of cotton)   Negative: Followed an ear injury   Negative: [1] Recently diagnosed with otitis media AND [2] currently on oral antibiotics   Negative: [1] Stiff neck (can't touch chin to chest) AND [2] fever   Negative: [1] Bony area of skull behind the ear is pink or swollen AND [2] fever   Negative: Fever > 104 F (40 C)   Negative: Patient sounds very sick or weak to the triager   Negative: [1] SEVERE pain AND [2] not improved 2 hours after taking analgesic medication (e.g., ibuprofen or acetaminophen)   Negative: Walking is very unsteady or feels very dizzy     Pt unsure   Negative: Sudden onset of ear pain after long - thin object was inserted into the ear canal (e.g., pencil, Q-tip)   Negative: Diabetes mellitus or weak immune system (e.g., HIV positive, cancer chemo, splenectomy, organ transplant, chronic steroids)   Negative: New " blurred vision or vision changes   Negative: White, yellow, or green discharge   Negative: Bloody discharge or unexplained bleeding from ear canal   Negative: Earache  (Exceptions: brief ear pain of < 60 minutes duration, earache occurring during air travel    Protocols used: Earache-A-AH

## 2024-08-21 LAB — BACTERIA BLD CULT: NO GROWTH
